# Patient Record
Sex: MALE | Race: WHITE | NOT HISPANIC OR LATINO | Employment: UNEMPLOYED | URBAN - METROPOLITAN AREA
[De-identification: names, ages, dates, MRNs, and addresses within clinical notes are randomized per-mention and may not be internally consistent; named-entity substitution may affect disease eponyms.]

---

## 2017-01-19 ENCOUNTER — OFFICE VISIT (OUTPATIENT)
Dept: CARDIOLOGY | Facility: MEDICAL CENTER | Age: 62
End: 2017-01-19
Payer: MEDICAID

## 2017-01-19 VITALS
HEART RATE: 104 BPM | BODY MASS INDEX: 26.34 KG/M2 | HEIGHT: 70 IN | WEIGHT: 184 LBS | SYSTOLIC BLOOD PRESSURE: 120 MMHG | DIASTOLIC BLOOD PRESSURE: 80 MMHG | OXYGEN SATURATION: 90 %

## 2017-01-19 DIAGNOSIS — I48.0 PAROXYSMAL ATRIAL FIBRILLATION (HCC): ICD-10-CM

## 2017-01-19 DIAGNOSIS — I10 ESSENTIAL HYPERTENSION: ICD-10-CM

## 2017-01-19 DIAGNOSIS — E78.5 DYSLIPIDEMIA: ICD-10-CM

## 2017-01-19 DIAGNOSIS — Z95.1 S/P CABG X 4: Primary | ICD-10-CM

## 2017-01-19 DIAGNOSIS — E11.8 DM (DIABETES MELLITUS) WITH COMPLICATIONS (HCC): ICD-10-CM

## 2017-01-19 DIAGNOSIS — I73.9 CLAUDICATION (HCC): ICD-10-CM

## 2017-01-19 DIAGNOSIS — I21.3 ST ELEVATION MYOCARDIAL INFARCTION (STEMI), UNSPECIFIED ARTERY (HCC): ICD-10-CM

## 2017-01-19 PROCEDURE — 99214 OFFICE O/P EST MOD 30 MIN: CPT | Performed by: INTERNAL MEDICINE

## 2017-01-19 RX ORDER — ASPIRIN 81 MG/1
TABLET ORAL
Qty: 30 TAB | Refills: 5 | Status: SHIPPED | OUTPATIENT
Start: 2017-01-19 | End: 2017-08-06 | Stop reason: SDUPTHER

## 2017-01-19 RX ORDER — ATORVASTATIN CALCIUM 40 MG/1
40 TABLET, FILM COATED ORAL
Qty: 90 TAB | Refills: 3 | Status: SHIPPED | OUTPATIENT
Start: 2017-01-19 | End: 2018-01-11 | Stop reason: SDUPTHER

## 2017-01-19 RX ORDER — CARVEDILOL 3.12 MG/1
3.12 TABLET ORAL 2 TIMES DAILY WITH MEALS
Qty: 180 TAB | Refills: 3 | Status: SHIPPED | OUTPATIENT
Start: 2017-01-19 | End: 2017-08-18

## 2017-01-19 RX ORDER — CLOPIDOGREL BISULFATE 75 MG/1
75 TABLET ORAL DAILY
Qty: 90 TAB | Refills: 3 | Status: SHIPPED | OUTPATIENT
Start: 2017-01-19 | End: 2018-01-11 | Stop reason: SDUPTHER

## 2017-01-19 RX ORDER — AMLODIPINE BESYLATE 10 MG/1
10 TABLET ORAL DAILY
Qty: 90 TAB | Refills: 3 | Status: SHIPPED | OUTPATIENT
Start: 2017-01-19 | End: 2018-01-11 | Stop reason: SDUPTHER

## 2017-01-19 RX ORDER — LISINOPRIL 10 MG/1
10 TABLET ORAL DAILY
Qty: 90 TAB | Refills: 3 | Status: SHIPPED | OUTPATIENT
Start: 2017-01-19 | End: 2018-01-11 | Stop reason: SDUPTHER

## 2017-01-19 ASSESSMENT — ENCOUNTER SYMPTOMS
COUGH: 1
INSOMNIA: 1
NAUSEA: 1
NERVOUS/ANXIOUS: 1
SENSORY CHANGE: 1
BLURRED VISION: 1
ORTHOPNEA: 0
BRUISES/BLEEDS EASILY: 1
TINGLING: 1
PALPITATIONS: 0
CLAUDICATION: 0
PND: 0

## 2017-01-19 NOTE — MR AVS SNAPSHOT
"        Aravind Quinones   2017 1:00 PM   Office Visit   MRN: [id number]    Department:  [facility]   Dept Phone:  [phone]    Description:  Male : 1955   Provider:  Andrew Kelley M.D.           Reason for Visit     Follow-Up           Allergies as of 2017     Allergen Noted Reactions    Sulfa Drugs 2012       ?; pt unsure of reaction      You were diagnosed with     S/P CABG x 4   [451152]  -  Primary     Dyslipidemia   [627143]       ST elevation myocardial infarction (STEMI), unspecified artery (CMS-HCC)   [5475219]       Essential hypertension   [4794079]       Paroxysmal atrial fibrillation (CMS-HCC)   [067636]       Claudication (CMS-HCC)   [976401]       DM (diabetes mellitus) with complications (CMS-HCC)   [748345]         Vital Signs     Blood Pressure Pulse Height Weight Body Mass Index Oxygen Saturation    120/80 mmHg 104 1.778 m (5' 10\") 83.462 kg (184 lb) 26.40 kg/m2 90%    Smoking Status                   Former Smoker           Basic Information     Date Of Birth Sex Race Ethnicity Preferred Language    1955 Male White Non- English      Problem List              ICD-10-CM Priority Class Noted - Resolved    DM (diabetes mellitus) (CMS-HCC) E11.9 Medium  2012 - Present    Family history of diabetes mellitus Z83.3   2012 - Present    Dyslipidemia, goal LDL below 70 E78.5 Low  2012 - Present    Vitamin D insufficiency E55.9 Low  2012 - Present    HTN (hypertension) I10 Medium  2012 - Present    DDD (degenerative disc disease) [id number]   2013 - Present    Arthritis of ankle joint M19.90   2013 - Present    Open wound T14.8   2013 - Present    Achilles tendon infection M65.10 Low  2/10/2015 - Present    Open wound of knee, leg (except thigh), and ankle, with tendon involvement S81.009A, S81.809A, S91.009A   2015 - Present    STEMI (ST elevation myocardial infarction) (CMS-HCC) I21.3 High  2016 - Present   " S/P CABG x 4 Z95.1 High  5/2/2016 - Present    Anemia D64.9 Medium Acute 5/4/2016 - Present    Atrial fibrillation (CMS-HCC) I48.91 High Acute 5/4/2016 - Present    Leukocytosis D72.829 Medium Acute 5/4/2016 - Present    Peripheral neuropathy (CMS-HCC) G62.9 Medium Acute 5/4/2016 - Present    Protein calorie malnutrition (CMS-HCC) E46 Medium Acute 5/4/2016 - Present    Cardiomyopathy (CMS-HCC) I42.9   5/31/2016 - Present    DM (diabetes mellitus) type II controlled, neurological manifestation (CMS-HCC) E11.49   7/6/2016 - Present    Chronic pain G89.29   7/6/2016 - Present    Coronary artery disease involving coronary bypass graft of native heart without angina pectoris I25.810   7/15/2016 - Present      Health Maintenance        Date Due Completion Dates    RETINAL SCREENING 7/12/1971 ---    COLONOSCOPY 7/12/2003 ---    IMM ZOSTER VACCINE 7/12/2013 ---    A1C SCREENING 6/19/2017 12/19/2016, 9/19/2016, 5/23/2016, 3/7/2016, 12/29/2015, 8/31/2015, 4/30/2015, 2/23/2015, 12/12/2014, 9/2/2014, 3/22/2014, 9/10/2013, 6/10/2013, 11/2/2012, 8/27/2012, 6/25/2012, 5/23/2012    DIABETES MONOFILAMENT / LE EXAM 10/27/2017 10/27/2016    FASTING LIPID PROFILE 12/19/2017 12/19/2016, 4/26/2016, 4/30/2015, 2/23/2015, 3/22/2014, 9/10/2013, 6/10/2013, 12/18/2012, 11/2/2012, 8/27/2012, 5/23/2012    URINE ACR / MICROALBUMIN 12/19/2017 12/19/2016, 9/19/2016, 5/23/2016, 3/7/2016, 8/31/2015, 2/23/2015, 12/12/2014, 5/23/2012    SERUM CREATININE 12/19/2017 12/19/2016, 9/19/2016, 5/23/2016, 5/7/2016, 5/3/2016, 5/2/2016, 5/1/2016, 4/30/2016, 4/29/2016, 4/28/2016, 4/27/2016, 4/26/2016, 4/25/2016, 4/25/2016, 4/30/2015, 4/7/2015, 3/31/2015, 3/24/2015, 3/17/2015, 3/10/2015, 3/3/2015, 2/23/2015, 12/12/2014, 3/22/2014, 9/10/2013, 6/10/2013, 2/11/2013, 11/2/2012, 8/27/2012, 6/29/2012, 6/25/2012, 5/23/2012    IMM DTaP/Tdap/Td Vaccine (2 - Td) 9/10/2025 9/10/2015            Current Immunizations     Influenza TIV (IM) 10/17/2013, 11/7/2012    Influenza  Vaccine Quad Inj (Preserved) 10/27/2016, 10/20/2015, 12/15/2014  1:14 PM    Pneumococcal polysaccharide vaccine (PPSV-23) 10/27/2016    Tdap Vaccine 9/10/2015      Below and/or attached are the medications your provider expects you to take. Review all of your home medications and newly ordered medications with your provider and/or pharmacist. Follow medication instructions as directed by your provider and/or pharmacist. Please keep your medication list with you and share with your provider. Update the information when medications are discontinued, doses are changed, or new medications (including over-the-counter products) are added; and carry medication information at all times in the event of emergency situations     Allergies:  SULFA DRUGS - (reactions not documented)               Medications  Valid as of: January 19, 2017 -  1:37 PM    Generic Name Brand Name Tablet Size Instructions for use    Albuterol Sulfate (Aero Soln) VENTOLIN  (90 BASE) MCG/ACT INHALE 2 PUFFS ORALLY EVERY 4 HOURS IF NEEDED FOR SHORTNESS OF BREATH        AmLODIPine Besylate (Tab) NORVASC 10 MG Take 1 Tab by mouth every day.        Aspirin (Tablet Delayed Response) aspirin 81 MG TAKE 1 TABLET ORALLY ONCE DAILY        Atorvastatin Calcium (Tab) LIPITOR 40 MG Take 1 Tab by mouth every bedtime.        Carvedilol (Tab) COREG 3.125 MG Take 1 Tab by mouth 2 times a day, with meals.        Cholecalciferol (Cap) Cholecalciferol 2000 UNIT Take 1 Cap by mouth every morning.        Clopidogrel Bisulfate (Tab) PLAVIX 75 MG Take 1 Tab by mouth every day.        Glucose Blood (Strip) glucose blood  AS DIRECTED TEST BLOOD SUGAR 4 TIMES DAILY        Insulin Glargine (Solution) LANTUS 100 UNIT/ML INJECT 5O UNITS SUBCUTANEOUSLY AS DIRECTED ONCE DAILY        Insulin Glargine (Solution) LANTUS 100 UNIT/ML Inject 40 Units as instructed every day. Increase by 2 u if blood sugars are above 180        Insulin Syringe-Needle U-100 (Misc) INSULIN SYRINGE  ".5CC/29G 29G X 1/2\" 0.5 ML USE AS DIRECTED BY PHYSICIAN        Insulin Syringe-Needle U-100 (Misc) INSULIN SYRINGE .5CC/29G 29G X 1/2\" 0.5 ML To use twice a day with insulin        Lisinopril (Tab) PRINIVIL 10 MG Take 1 Tab by mouth every day.        Misc. Devices (Misc) Misc. Devices  True metrix glucometer and teststrips to check blood sugars qid #120        Omeprazole (Tablet Delayed Response) PRILOSEC 20 MG TAKE 1 TABLET ORALLY ONCE DAILY        Oxycodone-Acetaminophen (Tab) PERCOCET 7.5-325 MG Take 1 Tab by mouth every 6 hours as needed for Moderate Pain (n).        Sennosides-Docusate Sodium (Tab) PERICOLACE or SENOKOT S 8.6-50 MG Take 1 Tab by mouth every day.        .                 Medicines prescribed today were sent to:     [facility]Y #[id number] - [place], [place] - [address] [address]    [address] [address]    Phone: [phone] Fax: [phone]    Open 24 Hours?: No      Medication refill instructions:       If your prescription bottle indicates you have medication refills left, it is not necessary to call your provider’s office. Please contact your pharmacy and they will refill your medication.    If your prescription bottle indicates you do not have any refills left, you may request refills at any time through one of the following ways: The online Websense system (except Urgent Care), by calling your provider’s office, or by asking your pharmacy to contact your provider’s office with a refill request. Medication refills are processed only during regular business hours and may not be available until the next business day. Your provider may request additional information or to have a follow-up visit with you prior to refilling your medication.   *Please Note: Medication refills are assigned a new Rx number when refilled electronically. Your pharmacy may indicate that no refills were authorized even though a new prescription for the same medication is available at the pharmacy. Please request " the medicine by name with the pharmacy before contacting your provider for a refill.        Your To Do List     Future Labs/Procedures Complete By Expires    HOLTER MONITOR STUDY  As directed 1/19/2018    US-DARIUS MULTI LEVEL BILAT  As directed 1/19/2018         MyChart Status: Patient Declined

## 2017-01-19 NOTE — Clinical Note
Name:          Aravind Quinones   YOB: 1955  Date:     1/19/2017      Toro Jurado M.D.  [address]  [facility] [address]     Andrew Kelley MD  [address], [address]  [place] [address]  Phone: [phone]  Back Line: [phone]  Fax: [phone]  E-mail: [email or web address]   Dear Dr. Jurado,    We had the pleasure of seeing your patient, Aravind Quinones, in Cardiology Clinic at [facility] today.    As you know, he is a 61-year-old man with a history of 4-V CABG 4/25/16 (LIMA-LAD, SVG-diag, SVG-OM, SVG-PDA), type II diabetes, hypertension, and dyslipidemia. He was a long-time smoker before bypass and has since quit.    He is doing well from a cardiovascular perspective. He has some left leg numbness and weakness that I suspect is related to lumbar degenerative disc disease. However, his pulse on his left posterior tibial was a bit less then his right (1+ on left, 2+ on right). Related to that, I did repeat his ankle-brachial indices. I refilled all of his cardiovascular medicines including atorvastatin 40 mg by mouth daily at bedtime, amlodipine 10 mg by mouth daily, lisinopril 10 mg by mouth daily, Coreg 3.125 mg by mouth twice a day aspirin 81 mg by mouth daily, and Plavix 75 mg by mouth daily. I will probably continue his Plavix indefinitely in the setting of peripheral artery disease associated with former smoking though he technically only needs that until March related to his prior event. If his ankle-brachial indices are unrevealing, I have asked him to follow-up with primary care for an MRI of his lumbar spine. He is in sinus rhythm by physical examination, and I did repeat a Holter monitor to ensure that he has no asymptomatic proximal systems of atrial fibrillation.    We will have the patient follow-up in 3 months.    Thank you for the referral and please do not hesitate to contact me at any time. My contact information is listed above.    This note was dictated  using Dragon speech recognition software.     A full note including my physical examination and a full list of rectified medications is available in our medical record, and can be faxed as well.    Andrew Kellye MD  Cardiologist  [facility]

## 2017-01-20 NOTE — PROGRESS NOTES
Subjective:   Aravind Quinones is a 61-year-old man with a history of 4-V CABG 4/25/16 (LIMA-LAD, SVG-diag, SVG-OM, SVG-PDA), type II diabetes, hypertension, and dyslipidemia. He was a long-time smoker before bypass and has since quit.    He has no cardiovascular complaint of he does note some weakness and numbness in his left leg. He previously had ankle-brachial indices, which showed moderate arterial insufficiency, but nothing severe of that leg. He is not started smoking again. He seems to have some relationship discord with his primary care physician.    He has noted his left lower extremity numbness and weakness for 10 months since the time of his surgery. He had noted some right arm weakness, but that has since subsided.    He has no side effects of his cardiovascular medicines, and I have refilled all of those at this time.    Past Medical History   Diagnosis Date   • Type II or unspecified type diabetes mellitus without mention of complication, not stated as uncontrolled      IDDM   • Arthritis      L ankle-Osteo   • Pain 02/24/15     L ankle-chronic=7/10   • Hypertension      controlled with meds   • High cholesterol      contolled with meds   • STEMI (ST elevation myocardial infarction) (CMS-[facility]) 4/25/2016   • Tobacco abuse 5/29/2012     Past Surgical History   Procedure Laterality Date   • Achilles tendon repair  1995   • Flexor tendon repair  2/25/2015     Performed by Hiram Enciso M.D. at SURGERY [facility]   • Irrigation & debridement ortho  2/25/2015     Performed by Hiram Enciso M.D. at SURGERY [facility]   • Multiple coronary artery bypass endo vein harvest  4/25/2016     Procedure: MULTIPLE CORONARY ARTERY BYPASS ENDO VEIN HARVEST- X4;  Surgeon: Medardo Goodson M.D.;  Location: SURGERY [facility];  Service:      Family History   Problem Relation Age of Onset   • Diabetes Mother    • Heart Disease Father    • Stroke Father      History   Smoking status   • Former  "Smoker -- 0.25 packs/day for 12 years   • Types: Cigarettes   • Quit date: 04/03/2016   Smokeless tobacco   • Never Used     Allergies   Allergen Reactions   • Sulfa Drugs      ?; pt unsure of reaction     Outpatient Encounter Prescriptions as of 1/19/2017   Medication Sig Dispense Refill   • atorvastatin (LIPITOR) 40 MG Tab Take 1 Tab by mouth every bedtime. 90 Tab 3   • amlodipine (NORVASC) 10 MG Tab Take 1 Tab by mouth every day. 90 Tab 3   • lisinopril (PRINIVIL) 10 MG Tab Take 1 Tab by mouth every day. 90 Tab 3   • carvedilol (COREG) 3.125 MG Tab Take 1 Tab by mouth 2 times a day, with meals. 180 Tab 3   • clopidogrel (PLAVIX) 75 MG Tab Take 1 Tab by mouth every day. 90 Tab 3   • aspirin (ASPIRIN LOW DOSE) 81 MG EC tablet TAKE 1 TABLET ORALLY ONCE DAILY 30 Tab 5   • insulin glargine (LANTUS) 100 UNIT/ML Solution Inject 40 Units as instructed every day. Increase by 2 u if blood sugars are above 180 10 mL 6   • Cholecalciferol 2000 UNIT Cap Take 1 Cap by mouth every morning. 30 Cap 6   • oxycodone-acetaminophen (PERCOCET) 7.5-325 MG per tablet Take 1 Tab by mouth every 6 hours as needed for Moderate Pain (n). (Patient not taking: Reported on 1/19/2017) 30 Tab 0   • VENTOLIN  (90 BASE) MCG/ACT Aero Soln inhalation aerosol INHALE 2 PUFFS ORALLY EVERY 4 HOURS IF NEEDED FOR SHORTNESS OF BREATH (Patient not taking: Reported on 1/19/2017) 1 Inhaler 2   • INSULIN SYRINGE .5CC/29G (ULTICARE INSULIN SYRINGE) 29G X 1/2\" 0.5 ML Misc To use twice a day with insulin 60 Each 2   • INSULIN SYRINGE .5CC/29G (ULTICARE INSULIN SYRINGE) 29G X 1/2\" 0.5 ML Misc USE AS DIRECTED BY PHYSICIAN 60 Each 0   • glucose blood (TRUETEST TEST) strip AS DIRECTED TEST BLOOD SUGAR 4 TIMES DAILY 100 Strip 11   • Misc. Devices Misc True metrix glucometer and teststrips to check blood sugars qid #120 120 Device 6   • omeprazole (PRILOSEC) 20 MG Tablet Delayed Response delayed-release tablet TAKE 1 TABLET ORALLY ONCE DAILY 28 Tab 8   • " "[DISCONTINUED] atorvastatin (LIPITOR) 40 MG Tab Take 1 Tab by mouth every bedtime. 90 Tab 3   • [DISCONTINUED] amlodipine (NORVASC) 10 MG Tab Take 1 Tab by mouth every day. 30 Tab 6   • [DISCONTINUED] lisinopril (PRINIVIL) 10 MG Tab Take 1 Tab by mouth every day. 30 Tab 11   • [DISCONTINUED] carvedilol (COREG) 3.125 MG Tab Take 1 Tab by mouth 2 times a day, with meals. 60 Tab 6   • [DISCONTINUED] clopidogrel (PLAVIX) 75 MG Tab Take 1 Tab by mouth every day. 30 Tab 3   • [DISCONTINUED] aspirin (ASPIRIN LOW DOSE) 81 MG EC tablet TAKE 1 TABLET ORALLY ONCE DAILY 30 Tab 5   • LANTUS 100 UNIT/ML Solution INJECT 5O UNITS SUBCUTANEOUSLY AS DIRECTED ONCE DAILY (Patient not taking: Reported on 1/19/2017) 10 mL 11   • senna-docusate (PERICOLACE OR SENOKOT S) 8.6-50 MG Tab Take 1 Tab by mouth every day. (Patient not taking: Reported on 1/19/2017) 30 Tab 0     No facility-administered encounter medications on file as of 1/19/2017.     Review of Systems   Eyes: Positive for blurred vision.   Respiratory: Positive for cough.    Cardiovascular: Positive for chest pain (musculoskeletal related to sternotomy) and leg swelling (L>R, prior achilles surgery just before CABG). Negative for palpitations, orthopnea, claudication and PND.   Gastrointestinal: Positive for nausea.   Neurological: Positive for tingling and sensory change (in his left leg with weakness).   Endo/Heme/Allergies: Bruises/bleeds easily.   Psychiatric/Behavioral: The patient is nervous/anxious and has insomnia.    All other systems reviewed and are negative.       Objective:   /80 mmHg  Pulse 104  Ht 1.778 m (5' 10\")  Wt 83.462 kg (184 lb)  BMI 26.40 kg/m2  SpO2 90%    Physical Exam   Constitutional: He is oriented to person, place, and time. He appears well-developed and well-nourished. No distress.   Somewhat gruff, elderly-appearing man though very pleasant in no distress   HENT:   Head: Normocephalic and atraumatic.   Eyes: Conjunctivae and EOM are " normal. Pupils are equal, round, and reactive to light. No scleral icterus.   Neck: Neck supple. No JVD present. No tracheal deviation present.   Cardiovascular: Normal rate, regular rhythm, normal heart sounds and intact distal pulses.  Exam reveals no gallop and no friction rub.    No murmur heard.  Pulses:       Dorsalis pedis pulses are 2+ on the right side, and 1+ on the left side.   No carotid bruits, healed midline sternotomy scar noted   Pulmonary/Chest: Effort normal. No stridor. No respiratory distress. He has wheezes. He has no rales.   Mild diffuse end expiratory wheezes, no crackles, no respiratory distress or accessory muscle use   Abdominal: Soft. Bowel sounds are normal. He exhibits no distension.   Musculoskeletal: He exhibits edema (1+ on the left, trace on the right with woody chronic venous stasis changes of his left lower extremity).   4-5 strength with plantar flexion of his left foot, 5 out of 5 strength with plantar flexion of his right foot. Mildly decreased sensation on his left lateral leg.   Neurological: He is alert and oriented to person, place, and time.   Skin: Skin is warm and dry. He is not diaphoretic. No erythema. No pallor.   Venous stasis changes left greater than right as described above   Psychiatric: He has a normal mood and affect. Judgment and thought content normal.   Vitals reviewed.      Assessment:     1. S/P CABG x 4  atorvastatin (LIPITOR) 40 MG Tab    lisinopril (PRINIVIL) 10 MG Tab    carvedilol (COREG) 3.125 MG Tab   2. Dyslipidemia  atorvastatin (LIPITOR) 40 MG Tab   3. ST elevation myocardial infarction (STEMI), unspecified artery (CMS-HCC)  clopidogrel (PLAVIX) 75 MG Tab   4. Essential hypertension  amlodipine (NORVASC) 10 MG Tab   5. Paroxysmal atrial fibrillation (CMS-HCC)  HOLTER MONITOR STUDY   6. Claudication (CMS-HCC)  US-DARIUS MULTI LEVEL BILAT   7. DM (diabetes mellitus) with complications (CMS-HCC)  aspirin (ASPIRIN LOW DOSE) 81 MG EC tablet       Medical  Decision Making:  Today's Assessment / Status / Plan:     Medical Decision Making:    He is doing well from a cardiovascular perspective. He has some left leg numbness and weakness that I suspect is related to lumbar degenerative disc disease. However, his pulse on his left posterior tibial was a bit less then his right (1+ on left, 2+ on right). Related to that, I did repeat his ankle-brachial indices. I refilled all of his cardiovascular medicines including atorvastatin 40 mg by mouth daily at bedtime, amlodipine 10 mg by mouth daily, lisinopril 10 mg by mouth daily, Coreg 3.125 mg by mouth twice a day aspirin 81 mg by mouth daily, and Plavix 75 mg by mouth daily. I will probably continue his Plavix indefinitely in the setting of peripheral artery disease associated with former smoking though he technically only needs that until March related to his prior event. If his ankle-brachial indices are unrevealing, I have asked him to follow-up with primary care for an MRI of his lumbar spine.    Andrew Kelley MD  Cardiologist, [facility]

## 2017-01-20 NOTE — PROGRESS NOTES
Name:          Aravind Quinones   YOB: 1955  Date:     1/19/2017      Toro Jurado M.D.  [address]  [facility] [address]     Andrew Kelley MD  [address], [address]  [place] [address]  Phone: [phone]  Back Line: [phone]  Fax: [phone]  E-mail: [email or web address]   Dear Dr. Jurado,    We had the pleasure of seeing your patient, Aravind Quinones, in Cardiology Clinic at [facility] today.    As you know, he is a 61-year-old man with a history of 4-V CABG 4/25/16 (LIMA-LAD, SVG-diag, SVG-OM, SVG-PDA), type II diabetes, hypertension, and dyslipidemia. He was a long-time smoker before bypass and has since quit.    He is doing well from a cardiovascular perspective. He has some left leg numbness and weakness that I suspect is related to lumbar degenerative disc disease. However, his pulse on his left posterior tibial was a bit less then his right (1+ on left, 2+ on right). Related to that, I did repeat his ankle-brachial indices. I refilled all of his cardiovascular medicines including atorvastatin 40 mg by mouth daily at bedtime, amlodipine 10 mg by mouth daily, lisinopril 10 mg by mouth daily, Coreg 3.125 mg by mouth twice a day aspirin 81 mg by mouth daily, and Plavix 75 mg by mouth daily. I will probably continue his Plavix indefinitely in the setting of peripheral artery disease associated with former smoking though he technically only needs that until March related to his prior event. If his ankle-brachial indices are unrevealing, I have asked him to follow-up with primary care for an MRI of his lumbar spine. He is in sinus rhythm by physical examination, and I did repeat a Holter monitor to ensure that he has no asymptomatic proximal systems of atrial fibrillation.    We will have the patient follow-up in 3 months.    Thank you for the referral and please do not hesitate to contact me at any time. My contact information is listed above.    This note was dictated  using Dragon speech recognition software.     A full note including my physical examination and a full list of rectified medications is available in our medical record, and can be faxed as well.    Andrew Kelley MD  Cardiologist  [facility]

## 2017-01-24 ENCOUNTER — HOSPITAL ENCOUNTER (OUTPATIENT)
Dept: RADIOLOGY | Facility: MEDICAL CENTER | Age: 62
End: 2017-01-24
Attending: INTERNAL MEDICINE
Payer: MEDICAID

## 2017-01-24 DIAGNOSIS — I73.9 CLAUDICATION (HCC): ICD-10-CM

## 2017-01-24 PROCEDURE — 93922 UPR/L XTREMITY ART 2 LEVELS: CPT

## 2017-01-24 PROCEDURE — 93922 UPR/L XTREMITY ART 2 LEVELS: CPT | Mod: 26 | Performed by: INTERNAL MEDICINE

## 2017-02-22 ENCOUNTER — OFFICE VISIT (OUTPATIENT)
Dept: INTERNAL MEDICINE | Facility: MEDICAL CENTER | Age: 62
End: 2017-02-22
Payer: MEDICAID

## 2017-02-22 VITALS
WEIGHT: 175 LBS | DIASTOLIC BLOOD PRESSURE: 62 MMHG | RESPIRATION RATE: 19 BRPM | TEMPERATURE: 98.2 F | HEIGHT: 70 IN | OXYGEN SATURATION: 95 % | HEART RATE: 88 BPM | BODY MASS INDEX: 25.05 KG/M2 | SYSTOLIC BLOOD PRESSURE: 138 MMHG

## 2017-02-22 DIAGNOSIS — E11.40 CONTROLLED TYPE 2 DIABETES MELLITUS WITH DIABETIC NEUROPATHY, UNSPECIFIED LONG TERM INSULIN USE STATUS: ICD-10-CM

## 2017-02-22 DIAGNOSIS — R79.89 LOW VITAMIN D LEVEL: ICD-10-CM

## 2017-02-22 DIAGNOSIS — G89.29 OTHER CHRONIC PAIN: ICD-10-CM

## 2017-02-22 DIAGNOSIS — E11.8 DM (DIABETES MELLITUS) WITH COMPLICATIONS (HCC): ICD-10-CM

## 2017-02-22 DIAGNOSIS — R20.0 NUMBNESS: ICD-10-CM

## 2017-02-22 DIAGNOSIS — K21.9 GASTROESOPHAGEAL REFLUX DISEASE, ESOPHAGITIS PRESENCE NOT SPECIFIED: ICD-10-CM

## 2017-02-22 PROCEDURE — 99204 OFFICE O/P NEW MOD 45 MIN: CPT | Mod: GC | Performed by: INTERNAL MEDICINE

## 2017-02-22 RX ORDER — METFORMIN HYDROCHLORIDE 500 MG/1
500 TABLET, EXTENDED RELEASE ORAL
Qty: 30 TAB | Refills: 2 | Status: SHIPPED | OUTPATIENT
Start: 2017-02-22 | End: 2017-06-05 | Stop reason: SDUPTHER

## 2017-02-22 RX ORDER — NICOTINE POLACRILEX 4 MG/1
GUM, CHEWING ORAL
Qty: 28 TAB | Refills: 8 | Status: SHIPPED | OUTPATIENT
Start: 2017-02-22 | End: 2017-02-23

## 2017-02-22 RX ORDER — IBUPROFEN 200 MG
TABLET ORAL
Qty: 60 EACH | Refills: 2 | Status: SHIPPED | OUTPATIENT
Start: 2017-02-22

## 2017-02-22 RX ORDER — INSULIN GLARGINE 100 [IU]/ML
INJECTION, SOLUTION SUBCUTANEOUS
Qty: 10 ML | Refills: 11 | Status: SHIPPED | OUTPATIENT
Start: 2017-02-22 | End: 2017-05-17

## 2017-02-22 ASSESSMENT — PAIN SCALES - GENERAL: PAINLEVEL: 6=MODERATE PAIN

## 2017-02-22 NOTE — MR AVS SNAPSHOT
"        Aravind Quinones   2017 2:45 PM   Office Visit   MRN: [id number]    Department:  [facility] Med - Internal Med   Dept Phone:  [phone]    Description:  Male : 1955   Provider:  Kitty Ang M.D.           Reason for Visit     Diabetes [facility] follow up      Allergies as of 2017     Allergen Noted Reactions    Sulfa Drugs 2012       ?; pt unsure of reaction      You were diagnosed with     DM (diabetes mellitus) with complications (CMS-HCC)   [168827]       Gastroesophageal reflux disease, esophagitis presence not specified   [4024645]       Controlled type 2 diabetes mellitus with diabetic neuropathy, unspecified long term insulin use status (CMS-HCC)   [0277624]       Numbness   [736927]       Other chronic pain   [338.29.ICD-9-CM]       Low vitamin D level   [3935480]         Vital Signs     Blood Pressure Pulse Temperature Respirations Height Weight    138/62 mmHg 88 36.8 °C (98.2 °F) 19 1.778 m (5' 10\") 79.379 kg (175 lb)    Body Mass Index Oxygen Saturation Smoking Status             25.11 kg/m2 95% Former Smoker         Basic Information     Date Of Birth Sex Race Ethnicity Preferred Language    1955 Male White Non- English      Your appointments     Mar 29, 2017  2:00 PM   Established Patient with Kitty Ang M.D.   [facility] - Internal Medicine (--)    [address]  [address]  [facility] [address] [phone]           You will be receiving a confirmation call a few days before your appointment from our automated call confirmation system.            2017  3:30 PM   FOLLOW UP with Andrew Kelley M.D.   [facility] (--)    [address] [place] [address] [address]  [place] [address] [phone]              Problem List              ICD-10-CM Priority Class Noted - Resolved    DM (diabetes mellitus) (CMS-[facility]) E11.9 Medium  2012 - Present    Family history of diabetes " mellitus Z83.3   5/22/2012 - Present    Dyslipidemia, goal LDL below 70 E78.5 Low  5/29/2012 - Present    Vitamin D insufficiency E55.9 Low  5/29/2012 - Present    HTN (hypertension) I10 Medium  5/29/2012 - Present    DDD (degenerative disc disease) NTM1409   2/14/2013 - Present    Arthritis of ankle joint M19.90   2/14/2013 - Present    Open wound T14.8   2/14/2013 - Present    Achilles tendon infection M65.10 Low  2/10/2015 - Present    Open wound of knee, leg (except thigh), and ankle, with tendon involvement S81.009A, S81.809A, S91.009A   2/25/2015 - Present    STEMI (ST elevation myocardial infarction) (CMS-HCC) I21.3 High  4/25/2016 - Present    S/P CABG x 4 Z95.1 High  5/2/2016 - Present    Anemia D64.9 Medium Acute 5/4/2016 - Present    Atrial fibrillation (CMS-HCC) I48.91 High Acute 5/4/2016 - Present    Leukocytosis D72.829 Medium Acute 5/4/2016 - Present    Peripheral neuropathy (CMS-HCC) G62.9 Medium Acute 5/4/2016 - Present    Protein calorie malnutrition (CMS-HCC) E46 Medium Acute 5/4/2016 - Present    Cardiomyopathy (CMS-HCC) I42.9   5/31/2016 - Present    DM (diabetes mellitus) type II controlled, neurological manifestation (CMS-HCC) E11.49   7/6/2016 - Present    Chronic pain G89.29   7/6/2016 - Present    Coronary artery disease involving coronary bypass graft of native heart without angina pectoris I25.810   7/15/2016 - Present      Health Maintenance        Date Due Completion Dates    RETINAL SCREENING 7/12/1971 ---    COLONOSCOPY 7/12/2003 ---    IMM ZOSTER VACCINE 7/12/2013 ---    A1C SCREENING 6/19/2017 12/19/2016, 9/19/2016, 5/23/2016, 3/7/2016, 12/29/2015, 8/31/2015, 4/30/2015, 2/23/2015, 12/12/2014, 9/2/2014, 3/22/2014, 9/10/2013, 6/10/2013, 11/2/2012, 8/27/2012, 6/25/2012, 5/23/2012    DIABETES MONOFILAMENT / LE EXAM 10/27/2017 10/27/2016    FASTING LIPID PROFILE 12/19/2017 12/19/2016, 4/26/2016, 4/30/2015, 2/23/2015, 3/22/2014, 9/10/2013, 6/10/2013, 12/18/2012, 11/2/2012, 8/27/2012,  5/23/2012    URINE ACR / MICROALBUMIN 12/19/2017 12/19/2016, 9/19/2016, 5/23/2016, 3/7/2016, 8/31/2015, 2/23/2015, 12/12/2014, 5/23/2012    SERUM CREATININE 12/19/2017 12/19/2016, 9/19/2016, 5/23/2016, 5/7/2016, 5/3/2016, 5/2/2016, 5/1/2016, 4/30/2016, 4/29/2016, 4/28/2016, 4/27/2016, 4/26/2016, 4/25/2016, 4/25/2016, 4/30/2015, 4/7/2015, 3/31/2015, 3/24/2015, 3/17/2015, 3/10/2015, 3/3/2015, 2/23/2015, 12/12/2014, 3/22/2014, 9/10/2013, 6/10/2013, 2/11/2013, 11/2/2012, 8/27/2012, 6/29/2012, 6/25/2012, 5/23/2012    IMM DTaP/Tdap/Td Vaccine (2 - Td) 9/10/2025 9/10/2015            Current Immunizations     Influenza TIV (IM) 10/17/2013, 11/7/2012    Influenza Vaccine Quad Inj (Preserved) 10/27/2016, 10/20/2015, 12/15/2014  1:14 PM    Pneumococcal polysaccharide vaccine (PPSV-23) 10/27/2016    Tdap Vaccine 9/10/2015      Below and/or attached are the medications your provider expects you to take. Review all of your home medications and newly ordered medications with your provider and/or pharmacist. Follow medication instructions as directed by your provider and/or pharmacist. Please keep your medication list with you and share with your provider. Update the information when medications are discontinued, doses are changed, or new medications (including over-the-counter products) are added; and carry medication information at all times in the event of emergency situations     Allergies:  SULFA DRUGS - (reactions not documented)               Medications  Valid as of: February 22, 2017 -  3:56 PM    Generic Name Brand Name Tablet Size Instructions for use    Albuterol Sulfate (Aero Soln) VENTOLIN  (90 BASE) MCG/ACT INHALE 2 PUFFS ORALLY EVERY 4 HOURS IF NEEDED FOR SHORTNESS OF BREATH        AmLODIPine Besylate (Tab) NORVASC 10 MG Take 1 Tab by mouth every day.        Aspirin (Tablet Delayed Response) aspirin 81 MG TAKE 1 TABLET ORALLY ONCE DAILY        Atorvastatin Calcium (Tab) LIPITOR 40 MG Take 1 Tab by mouth every  "bedtime.        Carvedilol (Tab) COREG 3.125 MG Take 1 Tab by mouth 2 times a day, with meals.        Cholecalciferol (Cap) Cholecalciferol 2000 UNIT Take 1 Cap by mouth every morning.        Clopidogrel Bisulfate (Tab) PLAVIX 75 MG Take 1 Tab by mouth every day.        Glucose Blood (Strip) glucose blood  AS DIRECTED TEST BLOOD SUGAR 4 TIMES DAILY        Insulin Glargine (Solution) LANTUS 100 UNIT/ML Inject 40 Units as instructed every day. Increase by 2 u if blood sugars are above 180        Insulin Glargine (Solution) LANTUS 100 UNIT/ML INJECT 5O UNITS SUBCUTANEOUSLY AS DIRECTED ONCE DAILY        Insulin Syringe-Needle U-100 (Misc) INSULIN SYRINGE .5CC/29G 29G X 1/2\" 0.5 ML USE AS DIRECTED BY PHYSICIAN        Insulin Syringe-Needle U-100 (Misc) INSULIN SYRINGE .5CC/29G 29G X 1/2\" 0.5 ML To use twice a day with insulin        Lisinopril (Tab) PRINIVIL 10 MG Take 1 Tab by mouth every day.        MetFORMIN HCl (TABLET SR 24 HR) GLUCOPHAGE  MG Take 1 Tab by mouth every bedtime.        Misc. Devices (Misc) Misc. Devices  True metrix glucometer and teststrips to check blood sugars qid #120        Omeprazole (Tablet Delayed Response) PRILOSEC 20 MG TAKE 1 TABLET ORALLY ONCE DAILY        Oxycodone-Acetaminophen (Tab) PERCOCET 7.5-325 MG Take 1 Tab by mouth every 6 hours as needed for Moderate Pain (n).        Sennosides-Docusate Sodium (Tab) PERICOLACE or SENOKOT S 8.6-50 MG Take 1 Tab by mouth every day.        .                 Medicines prescribed today were sent to:     [facility][id number] - [place] - [address] [address]    [address] [address] [place] [address]    Phone: [phone] Fax: [phone]    Open 24 Hours?: No      Medication refill instructions:       If your prescription bottle indicates you have medication refills left, it is not necessary to call your provider’s office. Please contact your pharmacy and they will refill your medication.    If your prescription bottle indicates you do not have " any refills left, you may request refills at any time through one of the following ways: The online [email or web address] system (except Urgent Care), by calling your provider’s office, or by asking your pharmacy to contact your provider’s office with a refill request. Medication refills are processed only during regular business hours and may not be available until the next business day. Your provider may request additional information or to have a follow-up visit with you prior to refilling your medication.   *Please Note: Medication refills are assigned a new Rx number when refilled electronically. Your pharmacy may indicate that no refills were authorized even though a new prescription for the same medication is available at the pharmacy. Please request the medicine by name with the pharmacy before contacting your provider for a refill.        Your To Do List     Future Labs/Procedures Complete By Expires    MR-LUMBAR SPINE-W/O  As directed 2/22/2018    VITAMIN D,25 HYDROXY  As directed 2/22/2018      Referral     A referral request has been sent to our patient care coordination department. Please allow 3-5 business days for us to process this request and contact you either by phone or mail. If you do not hear from us by the 5th business day, please call us at [phone].           [email or web address] Status: Patient Declined

## 2017-02-22 NOTE — PROGRESS NOTES
New Patient to Establish    Reason to establish: New patient to establish    CC:   1. Diabetes management   2. Pain management   3. Numbness in the left lower extremities        HPI: Aravind Quinones is a 61 y.o. Male with past history of Diabetes mellitus type 2 diagnosed 3 years ago, myocardial infarction s/p CABG, patient presented to the clinic to establish care after he disagree with his PCP, he states he was not treating him well, as he run out of pain medication and insulin for the past one month without refills, his insulin dose was 50 units of Lantus/daily before he run out of his medications, he denies any recent ER visit for hyperglycemia, any abdominal pain, nausea, vomiting, confusion, coma. Patient tried metformin in the past and stopped because of the GI upset.    Chronic pain management  the patient was on precost for the past three years/ prescribed by his PCP for his chronic pain, couple surgeries in the past, and back pain, patient refused to take Voltaren or ibuprofen when I offered for his pain and requested opiate after explanation patient agree to be referred to pain management clinic.    Numbness   Started after CABG surgery last year in both Rt arm and left leg, numbness in the arm resolved, but the one on the leg stay after surgery, denies any weakness, or balance issue, no neurological deficit were found on examination.    CAD  S/p CABG, pain resolved, denies any similar symptoms, no palpation, shortness of breath, following Dr. Kelley in [facility] cardiology        Review of Systems:     Constitutional: Denies fevers, Denies weight changes  Eyes: Denies eye pain  Ears/Nose/Throat/Mouth: Denies nasal congestion or sore throat   Cardiovascular: Denies chest pain or palpitations   Respiratory: Denies shortness of breath , Denies cough  Gastrointestinal/Hepatic: Denies abdominal pain, nausea, vomiting, diarrhea or constipation.  Genitourinary: Denies bladder dysfunction, dysuria or  frequency  Musculoskeletal/Rheum: per H and P  Skin: Denies rash.  Neurological: Denies headache, confusion, memory loss or focal weakness/parasthesias  Psychiatric: denies mood disorder      Patient Active Problem List    Diagnosis Date Noted   • Atrial fibrillation (CMS-HCC) 05/04/2016     Priority: High     Class: Acute   • S/P CABG x 4 05/02/2016     Priority: High   • STEMI (ST elevation myocardial infarction) (CMS-HCC) 04/25/2016     Priority: High   • Anemia 05/04/2016     Priority: Medium     Class: Acute   • Leukocytosis 05/04/2016     Priority: Medium     Class: Acute   • Peripheral neuropathy (CMS-HCC) 05/04/2016     Priority: Medium     Class: Acute   • Protein calorie malnutrition (CMS-HCC) 05/04/2016     Priority: Medium     Class: Acute   • HTN (hypertension) 05/29/2012     Priority: Medium   • DM (diabetes mellitus) (CMS-HCC) 05/22/2012     Priority: Medium   • Achilles tendon infection 02/10/2015     Priority: Low   • Dyslipidemia, goal LDL below 70 05/29/2012     Priority: Low   • Vitamin D insufficiency 05/29/2012     Priority: Low   • Coronary artery disease involving coronary bypass graft of native heart without angina pectoris 07/15/2016   • DM (diabetes mellitus) type II controlled, neurological manifestation (CMS-HCC) 07/06/2016   • Chronic pain 07/06/2016   • Cardiomyopathy (CMS-HCC) 05/31/2016   • Open wound of knee, leg (except thigh), and ankle, with tendon involvement 02/25/2015   • DDD (degenerative disc disease) 02/14/2013   • Arthritis of ankle joint 02/14/2013   • Open wound 02/14/2013   • Family history of diabetes mellitus 05/22/2012       Past Medical History   Diagnosis Date   • Type II or unspecified type diabetes mellitus without mention of complication, not stated as uncontrolled      IDDM   • Arthritis      L ankle-Osteo   • Pain 02/24/15     L ankle-chronic=7/10   • Hypertension      controlled with meds   • High cholesterol      contolled with meds   • STEMI (ST elevation  "myocardial infarction) (CMS-[facility]) 4/25/2016   • Tobacco abuse 5/29/2012       Current Outpatient Prescriptions   Medication Sig Dispense Refill   • atorvastatin (LIPITOR) 40 MG Tab Take 1 Tab by mouth every bedtime. 90 Tab 3   • amlodipine (NORVASC) 10 MG Tab Take 1 Tab by mouth every day. 90 Tab 3   • lisinopril (PRINIVIL) 10 MG Tab Take 1 Tab by mouth every day. 90 Tab 3   • carvedilol (COREG) 3.125 MG Tab Take 1 Tab by mouth 2 times a day, with meals. 180 Tab 3   • clopidogrel (PLAVIX) 75 MG Tab Take 1 Tab by mouth every day. 90 Tab 3   • aspirin (ASPIRIN LOW DOSE) 81 MG EC tablet TAKE 1 TABLET ORALLY ONCE DAILY 30 Tab 5   • oxycodone-acetaminophen (PERCOCET) 7.5-325 MG per tablet Take 1 Tab by mouth every 6 hours as needed for Moderate Pain (n). 30 Tab 0   • VENTOLIN  (90 BASE) MCG/ACT Aero Soln inhalation aerosol INHALE 2 PUFFS ORALLY EVERY 4 HOURS IF NEEDED FOR SHORTNESS OF BREATH 1 Inhaler 2   • INSULIN SYRINGE .5CC/29G (ULTICARE INSULIN SYRINGE) 29G X 1/2\" 0.5 ML Misc To use twice a day with insulin 60 Each 2   • INSULIN SYRINGE .5CC/29G (ULTICARE INSULIN SYRINGE) 29G X 1/2\" 0.5 ML Misc USE AS DIRECTED BY PHYSICIAN 60 Each 0   • glucose blood (TRUETEST TEST) strip AS DIRECTED TEST BLOOD SUGAR 4 TIMES DAILY 100 Strip 11   • Misc. Devices Misc True metrix glucometer and teststrips to check blood sugars qid #120 120 Device 6   • insulin glargine (LANTUS) 100 UNIT/ML Solution Inject 40 Units as instructed every day. Increase by 2 u if blood sugars are above 180 10 mL 6   • omeprazole (PRILOSEC) 20 MG Tablet Delayed Response delayed-release tablet TAKE 1 TABLET ORALLY ONCE DAILY 28 Tab 8   • LANTUS 100 UNIT/ML Solution INJECT 5O UNITS SUBCUTANEOUSLY AS DIRECTED ONCE DAILY 10 mL 11   • senna-docusate (PERICOLACE OR SENOKOT S) 8.6-50 MG Tab Take 1 Tab by mouth every day. 30 Tab 0   • Cholecalciferol 2000 UNIT Cap Take 1 Cap by mouth every morning. 30 Cap 6     No current facility-administered medications for " "this visit.       Allergies as of 02/22/2017 - [place] as Reviewed 02/22/2017   Allergen Reaction Noted   • Sulfa drugs  05/22/2012       Social History     Social History   • Marital Status:      Spouse Name: N/A   • Number of Children: N/A   • Years of Education: N/A     Occupational History   • Not on file.     Social History Main Topics   • Smoking status: Former Smoker -- 0.25 packs/day for 12 years     Types: Cigarettes     Quit date: 04/03/2016   • Smokeless tobacco: Never Used   • Alcohol Use: No   • Drug Use: No   • Sexual Activity:     Partners: Female     Other Topics Concern   • Not on file     Social History Narrative       Family History   Problem Relation Age of Onset   • Diabetes Mother    • Heart Disease Father    • Stroke Father        Past Surgical History   Procedure Laterality Date   • Achilles tendon repair  1995   • Flexor tendon repair  2/25/2015     Performed by Hiram Enciso M.D. at SURGERY [facility]   • Irrigation & debridement ortho  2/25/2015     Performed by Hiram Enciso M.D. at SURGERY [facility]   • Multiple coronary artery bypass endo vein harvest  4/25/2016     Procedure: MULTIPLE CORONARY ARTERY BYPASS ENDO VEIN HARVEST- X4;  Surgeon: Medardo Goodson M.D.;  Location: SURGERY [facility];  Service:      /62 mmHg  Pulse 88  Temp(Src) 36.8 °C (98.2 °F)  Resp 19  Ht 1.778 m (5' 10\")  Wt 79.379 kg (175 lb)  BMI 25.11 kg/m2  SpO2 95%    Physical Exam  General:  Alert and oriented, No apparent distress.  Eyes: Pupils equal and reactive. No scleral icterus.  Throat: Clear no erythema or exudates noted.  Neck: Supple. No lymphadenopathy noted. Thyroid not enlarged.  Lungs: Clear to auscultation bilaterally. No wheezes, rhonchi or crackles.  Cardiovascular: scar from CABG Regular rate and rhythm. No murmurs, rubs or gallops.  Abdomen:  Soft, non tender, non distended. Bowel sounds positive.  Extremities: No clubbing, cyanosis, edema. Scar on the left " "ankle from the tendon surgery  Skin: Clear. No rash or suspicious skin lesions noted.      Assessment and Plan    1. DM (diabetes mellitus) with complications (CMS-HCC)  - poor controlled   - Type 2 on insulin   - History of DM since 3 years ago  - last time use insulin was one month ago because of lack of prescription   - metformin cause GI upset, started on extended release trial with the insulin  - will start a small dose of metformin but extended release form for better tolerance than immediate release   - patient denies any active issues, quit smoking a year ago, drink alcohol occasionally.    - last hemoglobin A1c was  Lab Results   Component Value Date/Time    GLYCOHEMOGLOBIN 10.9* 12/19/2016 11:47 AM    GLYCOHEMOGLOBIN 9.5* 09/19/2016 09:26 AM    GLYCOHEMOGLOBIN 8.3* 05/23/2016 10:50 AM    GLYCOHEMOGLOBIN 10.9* 03/07/2016 10:26 AM     - Feet examination was normal   - Neuropathy negative on examination  - Blood pressure well controlled       Plan   - glucose blood (TRUETEST TEST) strip; AS DIRECTED TEST BLOOD SUGAR 4 TIMES DAILY  Dispense: 100 Strip; Refill: 11  - insulin glargine (LANTUS) 100 UNIT/ML Solution; INJECT 5O UNITS SUBCUTANEOUSLY AS DIRECTED ONCE DAILY  Dispense: 10 mL; Refill: 11  - INSULIN SYRINGE .5CC/29G (ULTICARE INSULIN SYRINGE) 29G X 1/2\" 0.5 ML Misc; To use twice a day with insulin  Dispense: 60 Each; Refill: 2  - REFERRAL TO OPHTHALMOLOGY  - metformin ER (GLUCOPHAGE XR) 500 MG TABLET SR 24 HR; Take 1 Tab by mouth every bedtime.  Dispense: 30 Tab; Refill: 2      2. Gastroesophageal reflux disease, esophagitis presence not specified  - stable with no nausea, vomiting, metal taste, change in voice or epigastric/ heartburn     Refills  - omeprazole (PRILOSEC) 20 MG Tablet Delayed Response delayed-release tablet; TAKE 1 TABLET ORALLY ONCE DAILY  Dispense: 28 Tab; Refill: 8    3. Low vitamin D level  - last time measured was in 2014  - patient on vit supplement / DC if the lab wnl    Plan   - " VITAMIN D,25 HYDROXY; Future        4. Numbness  - started after CABG in the Rt upper extremity improved but the left lower extremity remain with no improvement.    Plan  - MR-LUMBAR SPINE-W/O; Future    5. Other chronic pain  - patient had a chronic back pain, surgery related pain, requested oxycodone  - recently change his PCP  - Explained to the patient other alternative pain management options   - agree to be referred to TO PAIN CLINIC  - MR-LUMBAR SPINE-W/O; Future        6. CABG  - No cardiac symptoms since surgery last year in April,   - following Dr Kelley   - Received refills for his medication by cardiologist       Signed by: Kitty Ang M.D.

## 2017-02-23 DIAGNOSIS — K21.9 GASTROESOPHAGEAL REFLUX DISEASE WITHOUT ESOPHAGITIS: ICD-10-CM

## 2017-02-23 RX ORDER — MECOBALAMIN 5000 MCG
15 TABLET,DISINTEGRATING ORAL DAILY
Qty: 60 CAP | Refills: 0 | Status: SHIPPED | OUTPATIENT
Start: 2017-02-23 | End: 2017-04-26

## 2017-03-09 ENCOUNTER — TELEPHONE (OUTPATIENT)
Dept: INTERNAL MEDICINE | Facility: MEDICAL CENTER | Age: 62
End: 2017-03-09

## 2017-03-09 NOTE — TELEPHONE ENCOUNTER
1. Caller Name: Aravind Quinones                      Call Back Number: [phone] (home)       2. Message: Patient called left voice message to call [facility] pharmacy and refill order for True Metric glucose strips. I called [facility] pharmacy they have test strips waiting for patient to call regarding insurance method of payment. I called patient and left voice message for patient to call pharmacy.    3. Patient approves office to leave a detailed voicemail/MyChart message: yes

## 2017-04-25 ENCOUNTER — HOSPITAL ENCOUNTER (OUTPATIENT)
Dept: LAB | Facility: MEDICAL CENTER | Age: 62
End: 2017-04-25
Attending: INTERNAL MEDICINE
Payer: MEDICAID

## 2017-04-25 DIAGNOSIS — R79.89 LOW VITAMIN D LEVEL: ICD-10-CM

## 2017-04-25 LAB — 25(OH)D3 SERPL-MCNC: 27 NG/ML (ref 30–100)

## 2017-04-25 PROCEDURE — 36415 COLL VENOUS BLD VENIPUNCTURE: CPT

## 2017-04-25 PROCEDURE — 82306 VITAMIN D 25 HYDROXY: CPT

## 2017-04-26 ENCOUNTER — OFFICE VISIT (OUTPATIENT)
Dept: CARDIOLOGY | Facility: MEDICAL CENTER | Age: 62
End: 2017-04-26
Payer: MEDICAID

## 2017-04-26 VITALS
WEIGHT: 191 LBS | OXYGEN SATURATION: 93 % | HEIGHT: 70 IN | DIASTOLIC BLOOD PRESSURE: 80 MMHG | HEART RATE: 84 BPM | BODY MASS INDEX: 27.35 KG/M2 | SYSTOLIC BLOOD PRESSURE: 120 MMHG

## 2017-04-26 DIAGNOSIS — R00.2 PALPITATIONS: ICD-10-CM

## 2017-04-26 DIAGNOSIS — I25.810 CORONARY ARTERY DISEASE INVOLVING CORONARY BYPASS GRAFT OF NATIVE HEART WITHOUT ANGINA PECTORIS: ICD-10-CM

## 2017-04-26 DIAGNOSIS — R07.89 OTHER CHEST PAIN: ICD-10-CM

## 2017-04-26 DIAGNOSIS — I48.0 PAROXYSMAL ATRIAL FIBRILLATION (HCC): ICD-10-CM

## 2017-04-26 DIAGNOSIS — I10 ESSENTIAL HYPERTENSION: ICD-10-CM

## 2017-04-26 DIAGNOSIS — E78.5 DYSLIPIDEMIA: ICD-10-CM

## 2017-04-26 DIAGNOSIS — G89.4 CHRONIC PAIN SYNDROME: ICD-10-CM

## 2017-04-26 DIAGNOSIS — I42.9 CARDIOMYOPATHY (HCC): ICD-10-CM

## 2017-04-26 DIAGNOSIS — Z95.1 S/P CABG X 4: ICD-10-CM

## 2017-04-26 PROCEDURE — 99214 OFFICE O/P EST MOD 30 MIN: CPT | Performed by: INTERNAL MEDICINE

## 2017-04-26 ASSESSMENT — ENCOUNTER SYMPTOMS
ORTHOPNEA: 0
SENSORY CHANGE: 1
BACK PAIN: 1
TINGLING: 1
PND: 0
CLAUDICATION: 0
NERVOUS/ANXIOUS: 1
BRUISES/BLEEDS EASILY: 1
BLURRED VISION: 1
PALPITATIONS: 1
INSOMNIA: 1

## 2017-04-26 NOTE — MR AVS SNAPSHOT
"        Aravind Quinones   2017 3:30 PM   Office Visit   MRN: [id number]    Department:  [facility]   Dept Phone:  [phone]    Description:  Male : 1955   Provider:  nAdrew Kelley M.D.           Reason for Visit     Follow-Up           Allergies as of 2017     Allergen Noted Reactions    Sulfa Drugs 2012       ?; pt unsure of reaction      You were diagnosed with     S/P CABG x 4   [142218]       Chronic pain syndrome   [338.4.ICD-9-CM]       Other chest pain   [786.59.ICD-9-CM]       Coronary artery disease involving coronary bypass graft of native heart without angina pectoris   [8533353]       Palpitations   [785.1.ICD-9-CM]         Vital Signs     Blood Pressure Pulse Height Weight Body Mass Index Oxygen Saturation    120/80 mmHg 84 1.778 m (5' 10\") 86.637 kg (191 lb) 27.41 kg/m2 93%    Smoking Status                   Former Smoker           Basic Information     Date Of Birth Sex Race Ethnicity Preferred Language    1955 Male White Non- English      Your appointments     May 03, 2017  3:00 PM   Established Patient with Kitty Ang M.D.   [facility] / [facility] Med - Internal Medicine (--)    [address] [place] [address] [address]  [facility] [address] [phone]           You will be receiving a confirmation call a few days before your appointment from our automated call confirmation system.            Aug 18, 2017  3:15 PM   FOLLOW UP with Andrew Kelley M.D.   [facility] (--)    [address] [place] [address]  [facility] [address] [phone]              Problem List              ICD-10-CM Priority Class Noted - Resolved    DM (diabetes mellitus) (CMS-HCC) E11.9 Medium  2012 - Present    Family history of diabetes mellitus Z83.3   2012 - Present    Dyslipidemia, goal LDL below 70 E78.5 Low  2012 - Present    Vitamin D insufficiency E55.9 Low  2012 - Present    HTN (hypertension) I10 Medium  " 5/29/2012 - Present    DDD (degenerative disc disease) [id number]   2/14/2013 - Present    Arthritis of ankle joint M19.90   2/14/2013 - Present    Open wound T14.8   2/14/2013 - Present    Achilles tendon infection M65.10 Low  2/10/2015 - Present    Open wound of knee, leg (except thigh), and ankle, with tendon involvement S81.009A, S81.809A, S91.009A   2/25/2015 - Present    STEMI (ST elevation myocardial infarction) (CMS-HCC) I21.3 High  4/25/2016 - Present    S/P CABG x 4 Z95.1 High  5/2/2016 - Present    Anemia D64.9 Medium Acute 5/4/2016 - Present    Atrial fibrillation (CMS-HCC) I48.91 High Acute 5/4/2016 - Present    Leukocytosis D72.829 Medium Acute 5/4/2016 - Present    Peripheral neuropathy (CMS-HCC) G62.9 Medium Acute 5/4/2016 - Present    Protein calorie malnutrition (CMS-HCC) E46 Medium Acute 5/4/2016 - Present    Cardiomyopathy (CMS-HCC) I42.9   5/31/2016 - Present    DM (diabetes mellitus) type II controlled, neurological manifestation (CMS-HCC) E11.49   7/6/2016 - Present    Chronic pain G89.29   7/6/2016 - Present    Coronary artery disease involving coronary bypass graft of native heart without angina pectoris I25.810   7/15/2016 - Present      Health Maintenance        Date Due Completion Dates    RETINAL SCREENING 7/12/1971 ---    IMM ZOSTER VACCINE 7/12/2013 ---    A1C SCREENING 6/19/2017 12/19/2016, 9/19/2016, 5/23/2016, 3/7/2016, 12/29/2015, 8/31/2015, 4/30/2015, 2/23/2015, 12/12/2014, 9/2/2014, 3/22/2014, 9/10/2013, 6/10/2013, 11/2/2012, 8/27/2012, 6/25/2012, 5/23/2012    DIABETES MONOFILAMENT / LE EXAM 10/27/2017 10/27/2016    FASTING LIPID PROFILE 12/19/2017 12/19/2016, 4/26/2016, 4/30/2015, 2/23/2015, 3/22/2014, 9/10/2013, 6/10/2013, 12/18/2012, 11/2/2012, 8/27/2012, 5/23/2012    URINE ACR / MICROALBUMIN 12/19/2017 12/19/2016, 9/19/2016, 5/23/2016, 3/7/2016, 8/31/2015, 2/23/2015, 12/12/2014, 5/23/2012    SERUM CREATININE 12/19/2017 12/19/2016, 9/19/2016, 5/23/2016, 5/7/2016, 5/3/2016,  5/2/2016, 5/1/2016, 4/30/2016, 4/29/2016, 4/28/2016, 4/27/2016, 4/26/2016, 4/25/2016, 4/25/2016, 4/30/2015, 4/7/2015, 3/31/2015, 3/24/2015, 3/17/2015, 3/10/2015, 3/3/2015, 2/23/2015, 12/12/2014, 3/22/2014, 9/10/2013, 6/10/2013, 2/11/2013, 11/2/2012, 8/27/2012, 6/29/2012, 6/25/2012, 5/23/2012    IMM DTaP/Tdap/Td Vaccine (2 - Td) 9/10/2025 9/10/2015    COLONOSCOPY 4/20/2027 4/20/2017 (N/S)    Override on 4/20/2017: (N/S) (PER GIC AND C NO COLONOSCOPY)            Current Immunizations     Influenza TIV (IM) 10/17/2013, 11/7/2012    Influenza Vaccine Quad Inj (Preserved) 10/27/2016, 10/20/2015, 12/15/2014  1:14 PM    Pneumococcal polysaccharide vaccine (PPSV-23) 10/27/2016    Tdap Vaccine 9/10/2015      Below and/or attached are the medications your provider expects you to take. Review all of your home medications and newly ordered medications with your provider and/or pharmacist. Follow medication instructions as directed by your provider and/or pharmacist. Please keep your medication list with you and share with your provider. Update the information when medications are discontinued, doses are changed, or new medications (including over-the-counter products) are added; and carry medication information at all times in the event of emergency situations     Allergies:  SULFA DRUGS - (reactions not documented)               Medications  Valid as of: April 26, 2017 -  4:26 PM    Generic Name Brand Name Tablet Size Instructions for use    Albuterol Sulfate (Aero Soln) VENTOLIN  (90 BASE) MCG/ACT INHALE 2 PUFFS ORALLY EVERY 4 HOURS IF NEEDED FOR SHORTNESS OF BREATH        AmLODIPine Besylate (Tab) NORVASC 10 MG Take 1 Tab by mouth every day.        Aspirin (Tablet Delayed Response) aspirin 81 MG TAKE 1 TABLET ORALLY ONCE DAILY        Atorvastatin Calcium (Tab) LIPITOR 40 MG Take 1 Tab by mouth every bedtime.        Carvedilol (Tab) COREG 3.125 MG Take 1 Tab by mouth 2 times a day, with meals.        Cholecalciferol  "(Cap) Cholecalciferol 2000 UNIT Take 1 Cap by mouth every morning.        Clopidogrel Bisulfate (Tab) PLAVIX 75 MG Take 1 Tab by mouth every day.        Glucose Blood (Strip) glucose blood  AS DIRECTED TEST BLOOD SUGAR 4 TIMES DAILY        Insulin Glargine (Solution) LANTUS 100 UNIT/ML Inject 40 Units as instructed every day. Increase by 2 u if blood sugars are above 180        Insulin Glargine (Solution) LANTUS 100 UNIT/ML INJECT 5O UNITS SUBCUTANEOUSLY AS DIRECTED ONCE DAILY        Insulin Syringe-Needle U-100 (Misc) INSULIN SYRINGE .5CC/29G 29G X 1/2\" 0.5 ML USE AS DIRECTED BY PHYSICIAN        Insulin Syringe-Needle U-100 (Misc) INSULIN SYRINGE .5CC/29G 29G X 1/2\" 0.5 ML To use twice a day with insulin        Lansoprazole (CAPSULE DELAYED RELEASE) PREVACID 15 MG Take 1 Cap by mouth every day.        Lisinopril (Tab) PRINIVIL 10 MG Take 1 Tab by mouth every day.        MetFORMIN HCl (TABLET SR 24 HR) GLUCOPHAGE  MG Take 1 Tab by mouth every bedtime.        Misc. Devices (Misc) Misc. Devices  True metrix glucometer and teststrips to check blood sugars qid #120        Oxycodone-Acetaminophen (Tab) PERCOCET 7.5-325 MG Take 1 Tab by mouth every 6 hours as needed for Moderate Pain (n).        Sennosides-Docusate Sodium (Tab) PERICOLACE or SENOKOT S 8.6-50 MG Take 1 Tab by mouth every day.        .                 Medicines prescribed today were sent to:     [id number] - [place] - [address] [address]    [address] [address] [place] [address]    Phone: [phone] Fax: [phone]    Open 24 Hours?: No      Medication refill instructions:       If your prescription bottle indicates you have medication refills left, it is not necessary to call your provider’s office. Please contact your pharmacy and they will refill your medication.    If your prescription bottle indicates you do not have any refills left, you may request refills at any time through one of the following ways: The online [employer] system " (except Urgent Care), by calling your provider’s office, or by asking your pharmacy to contact your provider’s office with a refill request. Medication refills are processed only during regular business hours and may not be available until the next business day. Your provider may request additional information or to have a follow-up visit with you prior to refilling your medication.   *Please Note: Medication refills are assigned a new Rx number when refilled electronically. Your pharmacy may indicate that no refills were authorized even though a new prescription for the same medication is available at the pharmacy. Please request the medicine by name with the pharmacy before contacting your provider for a refill.        Your To Do List     Future Labs/Procedures Complete By Expires    HOLTER MONITOR STUDY  As directed 4/26/2018    NM-CARDIAC STRESS TEST  As directed 4/26/2018         Rocio Status: Patient Declined

## 2017-04-26 NOTE — Clinical Note
Name:          Aravind Quinones   YOB: 1955  Date:     4/26/2017      Kitty Ang M.D.  [address]  [place] [address]     Andrew Kelley MD  [address]  [place] [address]  Phone: [phone]  Back Line: [phone]  Fax: [phone]  E-mail: [email or web address]   Dear Dr. Ang,    We had the pleasure of seeing your patient, Aravind Quinones, in Cardiology Clinic at [facility] today.    As you know, he is a 61-year-old man with a history of 4-V CABG 4/25/16 (LIMA-LAD, SVG-diag, SVG-OM, SVG-PDA), type II diabetes, hypertension, and dyslipidemia. He was a long-time smoker before bypass and has since quit.    From the cardiovascular perspective his blood pressure is well controlled as are his lipids on medication though his blood sugars continue to be very poorly controlled with a recent A1c above 10 (which seems to be related to non-adherence to insulin). He continues to relate quite a bit of limitation related to his left lower extremity pain. I reviewed with him ankle brachial indices that I had ordered at our last appointment, which document essentially normal blood flow to his leg. I suspect that his leg pain is related to lumbar degenerative disc disease. He again perseverates on the idea that this is all the result of his bypass surgery, which I do not think is the case. He does also tell me about an odd sensation in his chest with lifting heavy weights that is concerning for potential angina versus arrhythmia. I worked that up with myocardial perfusion imaging, and a Holter monitor. I do not suspect that this represents loss of any of his bypass grafts or a true arrhythmia. He is, however, pushing for permanent disability. I do not suspect that he will meet criteria for that based on his cardiovascular disease as he is completely revascularized and on good medical management. I encouraged him to obtain the MRI previously ordered to evaluate  his lumbar degenerative disc disease.    We will have the patient follow-up in 3 months.    Thank you for the referral and please do not hesitate to contact me at any time. My contact information is listed above.    This note was dictated using Dragon speech recognition software.     A full note including my physical examination and a full list of rectified medications is available in our medical record, and can be faxed as well.    Andrew Kelley MD  Cardiologist  [facility]

## 2017-04-27 NOTE — PROGRESS NOTES
"Subjective:   Aravind Quinones is a 61 -year-old man with a history of 4-V CABG 4/25/16 (LIMA-LAD, SVG-diag, SVG-OM, SVG-PDA), type II diabetes, hypertension, and dyslipidemia. He was a long-time smoker before bypass and has since quit.    He seems to be doing actually relatively poorly today. He tells me that he has a sensation of his \"heart generally rounded his chest.\" That occurs when he picks up his 2-year-old granddaughter, or any other heavy objects. He also has some dyspnea, and chest discomfort that does not sound typical for angina.    His biggest complaint in limitation at this point his left lower extremity pain. I reviewed with him his previous ankle-brachial indices that I ordered at our last visit that showed normal blood flow. He is scheduled for an MRI of his spine though he has not yet done that.    Past Medical History   Diagnosis Date   • Type II or unspecified type diabetes mellitus without mention of complication, not stated as uncontrolled      IDDM   • Arthritis      L ankle-Osteo   • Pain 02/24/15     L ankle-chronic=7/10   • Hypertension      controlled with meds   • High cholesterol      contolled with meds   • STEMI (ST elevation myocardial infarction) (CMS-[facility]) 4/25/2016   • Tobacco abuse 5/29/2012   • 4-V CABG 4/25/16 (LIMA-LAD, SVG-diag, SVG-OM, SVG-PDA) 5/2/2016     Past Surgical History   Procedure Laterality Date   • Achilles tendon repair  1995   • Flexor tendon repair  2/25/2015     Performed by Hiram Enciso M.D. at [facility]   • Irrigation & debridement ortho  2/25/2015     Performed by Hiram Enciso M.D. at [facility]   • Multiple coronary artery bypass endo vein harvest  4/25/2016     Procedure: MULTIPLE CORONARY ARTERY BYPASS ENDO VEIN HARVEST- X4;  Surgeon: Medardo Goodson M.D.;  Location: [facility];  Service:      Family History   Problem Relation Age of Onset   • Diabetes Mother    • Heart Disease Father    • Stroke " "Father      History   Smoking status   • Former Smoker -- 0.25 packs/day for 12 years   • Types: Cigarettes   • Quit date: 04/03/2016   Smokeless tobacco   • Never Used     Allergies   Allergen Reactions   • Sulfa Drugs      ?; pt unsure of reaction     Outpatient Encounter Prescriptions as of 4/26/2017   Medication Sig Dispense Refill   • metformin ER (GLUCOPHAGE XR) 500 MG TABLET SR 24 HR Take 1 Tab by mouth every bedtime. 30 Tab 2   • atorvastatin (LIPITOR) 40 MG Tab Take 1 Tab by mouth every bedtime. 90 Tab 3   • amlodipine (NORVASC) 10 MG Tab Take 1 Tab by mouth every day. 90 Tab 3   • lisinopril (PRINIVIL) 10 MG Tab Take 1 Tab by mouth every day. 90 Tab 3   • carvedilol (COREG) 3.125 MG Tab Take 1 Tab by mouth 2 times a day, with meals. 180 Tab 3   • clopidogrel (PLAVIX) 75 MG Tab Take 1 Tab by mouth every day. 90 Tab 3   • aspirin (ASPIRIN LOW DOSE) 81 MG EC tablet TAKE 1 TABLET ORALLY ONCE DAILY 30 Tab 5   • Cholecalciferol 2000 UNIT Cap Take 1 Cap by mouth every morning. 30 Cap 6   • [DISCONTINUED] lansoprazole (PREVACID) 15 MG CAPSULE DELAYED RELEASE Take 1 Cap by mouth every day. (Patient not taking: Reported on 4/26/2017) 60 Cap 0   • glucose blood (TRUETEST TEST) strip AS DIRECTED TEST BLOOD SUGAR 4 TIMES DAILY 100 Strip 11   • insulin glargine (LANTUS) 100 UNIT/ML Solution INJECT 5O UNITS SUBCUTANEOUSLY AS DIRECTED ONCE DAILY 10 mL 11   • INSULIN SYRINGE .5CC/29G (ULTICARE INSULIN SYRINGE) 29G X 1/2\" 0.5 ML Misc To use twice a day with insulin 60 Each 2   • [DISCONTINUED] oxycodone-acetaminophen (PERCOCET) 7.5-325 MG per tablet Take 1 Tab by mouth every 6 hours as needed for Moderate Pain (n). (Patient not taking: Reported on 4/26/2017) 30 Tab 0   • [DISCONTINUED] VENTOLIN  (90 BASE) MCG/ACT Aero Soln inhalation aerosol INHALE 2 PUFFS ORALLY EVERY 4 HOURS IF NEEDED FOR SHORTNESS OF BREATH (Patient not taking: Reported on 4/26/2017) 1 Inhaler 2   • INSULIN SYRINGE .5CC/29G (ULTICARE INSULIN " "SYRINGE) 29G X 1/2\" 0.5 ML Misc USE AS DIRECTED BY PHYSICIAN 60 Each 0   • Misc. Devices Misc True metrix glucometer and teststrips to check blood sugars qid #120 120 Device 6   • [DISCONTINUED] insulin glargine (LANTUS) 100 UNIT/ML Solution Inject 40 Units as instructed every day. Increase by 2 u if blood sugars are above 180 (Patient not taking: Reported on 4/26/2017) 10 mL 6   • [DISCONTINUED] senna-docusate (PERICOLACE OR SENOKOT S) 8.6-50 MG Tab Take 1 Tab by mouth every day. (Patient not taking: Reported on 4/26/2017) 30 Tab 0     No facility-administered encounter medications on file as of 4/26/2017.     Review of Systems   Eyes: Positive for blurred vision.   Cardiovascular: Positive for chest pain and palpitations. Negative for orthopnea, claudication, leg swelling and PND.   Musculoskeletal: Positive for back pain (radiates down is left leg).   Neurological: Positive for tingling and sensory change (in his left leg with weakness).   Endo/Heme/Allergies: Bruises/bleeds easily.   Psychiatric/Behavioral: The patient is nervous/anxious and has insomnia.    All other systems reviewed and are negative.       Objective:   /80 mmHg  Pulse 84  Ht 1.778 m (5' 10\")  Wt 86.637 kg (191 lb)  BMI 27.41 kg/m2  SpO2 93%    Physical Exam   Constitutional: He is oriented to person, place, and time. He appears well-developed and well-nourished. No distress.   Somewhat gruff, elderly-appearing man though pleasant in no distress   HENT:   Head: Normocephalic and atraumatic.   Eyes: Conjunctivae and EOM are normal. Pupils are equal, round, and reactive to light. No scleral icterus.   Neck: Neck supple. No JVD present. No tracheal deviation present.   Cardiovascular: Normal rate, regular rhythm, normal heart sounds and intact distal pulses.  Exam reveals no gallop and no friction rub.    No murmur heard.  Pulses:       Dorsalis pedis pulses are 2+ on the right side, and 1+ on the left side.   No carotid bruits, healed " "midline sternotomy scar noted   Pulmonary/Chest: Effort normal. No stridor. No respiratory distress. He has no wheezes. He has no rales.   Abdominal: Soft. Bowel sounds are normal. He exhibits no distension.   Musculoskeletal: He exhibits edema (1+ on the left, trace on the right with woody chronic venous stasis changes of his left lower extremity).   4-5 strength with plantar flexion of his left foot, 5 out of 5 strength with plantar flexion of his right foot. Mildly decreased sensation on his left lateral leg.   Neurological: He is alert and oriented to person, place, and time.   Full exam deferred   Skin: Skin is warm and dry. He is not diaphoretic. No erythema. No pallor.   Venous stasis changes left greater than right as described above   Psychiatric: He has a normal mood and affect. Judgment and thought content normal.   Vitals reviewed.       4/26/2016 01:30 12/19/2016 11:47   Cholesterol,Tot 81 (L) 113   Triglycerides 59 64   HDL 21 (A) 32 (A)   LDL 48 68     Echocardiogram, 4/26/2016:  \"CONCLUSIONS  Normal left ventricular chamber size. Moderate concentric left   ventricular hypertrophy. Severely reduced left ventricular systolic   function. Left ventricular ejection fraction is visually estimated to   be 20%. Left ventricular ejection fraction is visually estimated to be   20%. Apical akinesis. 3 ML of contrast was administered.  No significant valve abnormalities.   No prior study is available for comparison.\"    Ankle-brachial indices, 1/24/2017:  \" Findings   Bilateral.    Doppler waveforms at the ankle are brisk and biphasic.    Ankle-brachial index is normal.    Active pedal plantar flexion (toe-up) exercise was performed for 50    repetitions(goal 50).    Post exercise ankle/brachial index:              Right:       0.96            Left:.      1.06   Additional testing was not performed in accordance with lower extremity    arterial evaluation protocol\"      Assessment:     1. S/P CABG x 4     2. " Chronic pain syndrome     3. Other chest pain  HOLTER MONITOR STUDY    NM-CARDIAC STRESS TEST   4. Coronary artery disease involving coronary bypass graft of native heart without angina pectoris  NM-CARDIAC STRESS TEST   5. Palpitations  HOLTER MONITOR STUDY   6. Essential hypertension     7. Paroxysmal atrial fibrillation (CMS-HCC)     8. Cardiomyopathy (CMS-HCC)     9. Dyslipidemia         Medical Decision Making:  Today's Assessment / Status / Plan:     Medical Decision Making:    From the cardiovascular perspective his blood pressure is well controlled as are his lipids on medication though his blood sugars continue to be very poorly controlled with a recent A1c above 10 (which seems to be related to non-adherence to insulin). He continues to relate quite a bit of limitation related to his left lower extremity pain. I reviewed with him ankle brachial indices that I had ordered at our last appointment, which document essentially normal blood flow to his leg. I suspect that his leg pain is related to lumbar degenerative disc disease. He again perseverates on the idea that this is all the result of his bypass surgery, which I do not think is the case. He does also tell me about an odd sensation in his chest with lifting heavy weights that is concerning for potential angina versus arrhythmia. I worked that up with myocardial perfusion imaging, and a Holter monitor. I do not suspect that this represents loss of any of his bypass grafts or a true arrhythmia. He is, however, pushing for permanent disability. I do not suspect that he will meet criteria for that based on his cardiovascular disease as he is completely revascularized and on good medical management. I encouraged him to obtain the MRI previously ordered to evaluate his lumbar degenerative disc disease.    Andrew Kelley MD  Cardiologist, [facility] [place]

## 2017-04-27 NOTE — PROGRESS NOTES
Name:          Aravind Quinones   YOB: 1955  Date:     4/26/2017      Kitty Ang M.D.  [address]  [place] [address]     Andrew Kelley MD  [address]  [place] [address]  Phone: [phone]  Back Line: [phone]  Fax: [phone]  E-mail: [email or web address]   Dear Dr. Ang,    We had the pleasure of seeing your patient, Aravind Quinones, in Cardiology Clinic at [facility] today.    As you know, he is a 61-year-old man with a history of 4-V CABG 4/25/16 (LIMA-LAD, SVG-diag, SVG-OM, SVG-PDA), type II diabetes, hypertension, and dyslipidemia. He was a long-time smoker before bypass and has since quit.    From the cardiovascular perspective his blood pressure is well controlled as are his lipids on medication though his blood sugars continue to be very poorly controlled with a recent A1c above 10 (which seems to be related to non-adherence to insulin). He continues to relate quite a bit of limitation related to his left lower extremity pain. I reviewed with him ankle brachial indices that I had ordered at our last appointment, which document essentially normal blood flow to his leg. I suspect that his leg pain is related to lumbar degenerative disc disease. He again perseverates on the idea that this is all the result of his bypass surgery, which I do not think is the case. He does also tell me about an odd sensation in his chest with lifting heavy weights that is concerning for potential angina versus arrhythmia. I worked that up with myocardial perfusion imaging, and a Holter monitor. I do not suspect that this represents loss of any of his bypass grafts or a true arrhythmia. He is, however, pushing for permanent disability. I do not suspect that he will meet criteria for that based on his cardiovascular disease as he is completely revascularized and on good medical management. I encouraged him to obtain the MRI previously ordered to evaluate  his lumbar degenerative disc disease.    We will have the patient follow-up in 3 months.    Thank you for the referral and please do not hesitate to contact me at any time. My contact information is listed above.    This note was dictated using Dragon speech recognition software.     A full note including my physical examination and a full list of rectified medications is available in our medical record, and can be faxed as well.    Andrew Kelley MD  Cardiologist  [facility]

## 2017-05-03 ENCOUNTER — APPOINTMENT (OUTPATIENT)
Dept: INTERNAL MEDICINE | Facility: MEDICAL CENTER | Age: 62
End: 2017-05-03
Payer: MEDICAID

## 2017-05-12 ENCOUNTER — TELEPHONE (OUTPATIENT)
Dept: INTERNAL MEDICINE | Facility: MEDICAL CENTER | Age: 62
End: 2017-05-12

## 2017-05-12 NOTE — TELEPHONE ENCOUNTER
Pharmacy Name: [facility]    Pharmacy Phone#: [phone]    Pharmacy Fax#: [phone]    Request received via: fax    Message: pharmacy asking for lantus to be changed to Basaglar due to insurance preference please order if appropriate and send to pharmacy

## 2017-05-17 DIAGNOSIS — E11.8 DM (DIABETES MELLITUS) WITH COMPLICATIONS (HCC): ICD-10-CM

## 2017-05-22 ENCOUNTER — TELEPHONE (OUTPATIENT)
Dept: INTERNAL MEDICINE | Facility: MEDICAL CENTER | Age: 62
End: 2017-05-22

## 2017-05-22 DIAGNOSIS — E11.49 TYPE 2 DIABETES MELLITUS WITH OTHER NEUROLOGIC COMPLICATION: ICD-10-CM

## 2017-05-22 DIAGNOSIS — E11.8 DM (DIABETES MELLITUS) WITH COMPLICATIONS (HCC): ICD-10-CM

## 2017-05-22 NOTE — TELEPHONE ENCOUNTER
1. Caller Name: Aravind Quinones                      Call Back Number: [phone] (home)       2. Message[facility] please change Basaglar or Toujeo.    3. Patient approves office to leave a detailed voicemail/MyChart message: yes    Patient needs pen and neddles too.

## 2017-05-22 NOTE — TELEPHONE ENCOUNTER
Last seen: 02/22/17 by Dr. Ang  Next appt: 06/07/17 with Dr. Ang    Was the patient seen in the last year in this department? Yes   Does patient have an active prescription for medications requested? No   Received Request Via: Pharmacy        Last request failed to send to pharmacy

## 2017-05-23 ENCOUNTER — HOSPITAL ENCOUNTER (OUTPATIENT)
Dept: RADIOLOGY | Facility: MEDICAL CENTER | Age: 62
End: 2017-05-23
Attending: INTERNAL MEDICINE
Payer: MEDICAID

## 2017-05-23 DIAGNOSIS — E11.49 TYPE 2 DIABETES MELLITUS WITH OTHER NEUROLOGIC COMPLICATION: ICD-10-CM

## 2017-05-23 DIAGNOSIS — R20.0 NUMBNESS: ICD-10-CM

## 2017-05-23 DIAGNOSIS — G89.29 OTHER CHRONIC PAIN: ICD-10-CM

## 2017-05-23 PROCEDURE — 72148 MRI LUMBAR SPINE W/O DYE: CPT

## 2017-05-24 ENCOUNTER — TELEPHONE (OUTPATIENT)
Dept: INTERNAL MEDICINE | Facility: MEDICAL CENTER | Age: 62
End: 2017-05-24

## 2017-05-24 DIAGNOSIS — M54.5 LOW BACK PAIN, UNSPECIFIED BACK PAIN LATERALITY, UNSPECIFIED CHRONICITY, WITH SCIATICA PRESENCE UNSPECIFIED: ICD-10-CM

## 2017-06-05 DIAGNOSIS — E11.8 DM (DIABETES MELLITUS) WITH COMPLICATIONS (HCC): ICD-10-CM

## 2017-06-05 RX ORDER — METFORMIN HYDROCHLORIDE 500 MG/1
500 TABLET, EXTENDED RELEASE ORAL
Qty: 30 TAB | Refills: 2 | Status: SHIPPED | OUTPATIENT
Start: 2017-06-05 | End: 2017-09-06 | Stop reason: SDUPTHER

## 2017-06-05 NOTE — TELEPHONE ENCOUNTER
Was the patient seen in the last year in this department? Yes  Pt last seen on: 02/22/2017 by Dr. Ang Next appt on: 06/07/2017      Does patient have an active prescription for medications requested? No     Received Request Via: Pharmacy

## 2017-06-07 ENCOUNTER — OFFICE VISIT (OUTPATIENT)
Dept: INTERNAL MEDICINE | Facility: MEDICAL CENTER | Age: 62
End: 2017-06-07
Payer: MEDICAID

## 2017-06-07 VITALS
RESPIRATION RATE: 18 BRPM | OXYGEN SATURATION: 94 % | HEIGHT: 70 IN | WEIGHT: 183.4 LBS | BODY MASS INDEX: 26.26 KG/M2 | TEMPERATURE: 97.8 F | HEART RATE: 76 BPM | DIASTOLIC BLOOD PRESSURE: 72 MMHG | SYSTOLIC BLOOD PRESSURE: 126 MMHG

## 2017-06-07 DIAGNOSIS — E11.42 DIABETIC POLYNEUROPATHY ASSOCIATED WITH TYPE 2 DIABETES MELLITUS (HCC): ICD-10-CM

## 2017-06-07 PROCEDURE — 99213 OFFICE O/P EST LOW 20 MIN: CPT | Mod: GE | Performed by: INTERNAL MEDICINE

## 2017-06-07 RX ORDER — GABAPENTIN 300 MG/1
300 CAPSULE ORAL 3 TIMES DAILY
Qty: 90 CAP | Refills: 2 | Status: SHIPPED | OUTPATIENT
Start: 2017-06-07 | End: 2017-09-06 | Stop reason: SDUPTHER

## 2017-06-07 ASSESSMENT — PAIN SCALES - GENERAL: PAINLEVEL: 8=MODERATE-SEVERE PAIN

## 2017-06-07 ASSESSMENT — PATIENT HEALTH QUESTIONNAIRE - PHQ9: CLINICAL INTERPRETATION OF PHQ2 SCORE: 0

## 2017-06-07 NOTE — PROGRESS NOTES
Established Patient    Aravind presents today with the following:    CC: review the MRI result and left leg pain    HPI:   Aravind Quinones is a 61 y.o. male with past history of Diabetes mellitus type 2 presented to the clinic to review MRI result, patient informed regarding the result of MRI showed minimal to mild degenerative changes in the lumber area, explain to him that his pain and numbness in his left leg may related to his neuropathy as the patient describe the pain as needle and sharp mainly on the left leg and feet however it affect the right leg too, patient was able to come to the office without using cane or wheelchair, claim that his leg pain started after the CABG surgery done 14 month ago as he reported, patient was thinking about applying for disability, agree to retry the gabapentin and re-evaluated in the next three months. Patient already evaluated to the pain management clinic since the last visit.     Review of Systems:     Constitutional: Denies fevers, Denies weight changes  Eyes: Denies changes in vision, no eye pain  Ears/Nose/Throat/Mouth: Denies nasal congestion or sore throat   Cardiovascular: Denies chest pain or palpitations   Respiratory: Denies shortness of breath , Denies cough  Gastrointestinal/Hepatic: Denies abdominal pain, nausea, vomiting, diarrhea or constipation.  Genitourinary: Denies bladder dysfunction, dysuria or frequency  Musculoskeletal/Rheum: per H and P  Skin: Denies rash.  Neurological: Denies headache, confusion, memory loss   Psychiatric: denies mood disorder         Patient Active Problem List    Diagnosis Date Noted   • Atrial fibrillation (CMS-HCC) 05/04/2016     Priority: High     Class: Acute   • 4-V CABG 4/25/16 (LIMA-LAD, SVG-diag, SVG-OM, SVG-PDA) 05/02/2016     Priority: High   • STEMI (ST elevation myocardial infarction) (CMS-HCC) 04/25/2016     Priority: High   • Anemia 05/04/2016     Priority: Medium     Class: Acute   • Leukocytosis 05/04/2016      "Priority: Medium     Class: Acute   • Peripheral neuropathy 05/04/2016     Priority: Medium     Class: Acute   • Protein calorie malnutrition (CMS-HCC) 05/04/2016     Priority: Medium     Class: Acute   • HTN (hypertension) 05/29/2012     Priority: Medium   • DM (diabetes mellitus) (CMS-HCC) 05/22/2012     Priority: Medium   • Achilles tendon infection 02/10/2015     Priority: Low   • Dyslipidemia, goal LDL below 70 05/29/2012     Priority: Low   • Vitamin D insufficiency 05/29/2012     Priority: Low   • Dyslipidemia 04/26/2017   • Coronary artery disease involving coronary bypass graft of native heart without angina pectoris 07/15/2016   • DM (diabetes mellitus) type II controlled, neurological manifestation (CMS-HCC) 07/06/2016   • Chronic pain 07/06/2016   • Cardiomyopathy (CMS-HCC) 05/31/2016   • Open wound of knee, leg (except thigh), and ankle, with tendon involvement 02/25/2015   • DDD (degenerative disc disease) 02/14/2013   • Arthritis of ankle joint 02/14/2013   • Open wound 02/14/2013   • Family history of diabetes mellitus 05/22/2012       Current Outpatient Prescriptions   Medication Sig Dispense Refill   • gabapentin (NEURONTIN) 300 MG Cap Take 1 Cap by mouth 3 times a day. 90 Cap 2   • metformin ER (GLUCOPHAGE XR) 500 MG TABLET SR 24 HR Take 1 Tab by mouth every bedtime. 30 Tab 2   • insulin glargine (BASAGLAR KWIKPEN) 100 UNIT/ML Solution Pen-injector injection Inject 50 Units as instructed every evening for 30 days. 15 mL 4   • Insulin Pen Needle 30G X 5 MM Misc Inject 1 Each as instructed every evening. 30 Each 4   • insulin glargine (LANTUS) 100 UNIT/ML Solution Pen-injector injection Inject 50 Units as instructed every evening for 30 days. 15 mL 4   • glucose blood (TRUETEST TEST) strip AS DIRECTED TEST BLOOD SUGAR 4 TIMES DAILY 100 Strip 11   • INSULIN SYRINGE .5CC/29G (ULTICARE INSULIN SYRINGE) 29G X 1/2\" 0.5 ML Misc To use twice a day with insulin 60 Each 2   • atorvastatin (LIPITOR) 40 MG Tab " "Take 1 Tab by mouth every bedtime. 90 Tab 3   • amlodipine (NORVASC) 10 MG Tab Take 1 Tab by mouth every day. 90 Tab 3   • lisinopril (PRINIVIL) 10 MG Tab Take 1 Tab by mouth every day. 90 Tab 3   • carvedilol (COREG) 3.125 MG Tab Take 1 Tab by mouth 2 times a day, with meals. 180 Tab 3   • clopidogrel (PLAVIX) 75 MG Tab Take 1 Tab by mouth every day. 90 Tab 3   • aspirin (ASPIRIN LOW DOSE) 81 MG EC tablet TAKE 1 TABLET ORALLY ONCE DAILY 30 Tab 5   • INSULIN SYRINGE .5CC/29G (ULTICARE INSULIN SYRINGE) 29G X 1/2\" 0.5 ML Misc USE AS DIRECTED BY PHYSICIAN 60 Each 0   • Misc. Devices Misc True metrix glucometer and teststrips to check blood sugars qid #120 120 Device 6   • Cholecalciferol 2000 UNIT Cap Take 1 Cap by mouth every morning. 30 Cap 6     No current facility-administered medications for this visit.       /72 mmHg  Pulse 76  Temp(Src) 36.6 °C (97.8 °F)  Resp 18  Ht 1.778 m (5' 10\")  Wt 83.19 kg (183 lb 6.4 oz)  BMI 26.32 kg/m2  SpO2 94%    Physical Exam   General:  Alert and oriented, No apparent distress.  Eyes: Pupils equal and reactive. No scleral icterus.  Throat: Clear no erythema or exudates noted.  Neck: Supple. No lymphadenopathy noted. Thyroid not enlarged.  Lungs: Clear to auscultation bilaterally. No wheezes, rhonchi or crackles.  Cardiovascular: scar from CABG Regular rate and rhythm. No murmurs, rubs or gallops.  Abdomen:  Soft, non tender, non distended. Bowel sounds positive.  Extremities: No clubbing, cyanosis, edema. Scar on the left ankle from the tendon surgery  Patient limb with walking.  Skin: Clear. No rash or suspicious skin lesions noted.      Assessment and Plan    1. Diabetic polyneuropathy associated with type 2 diabetes mellitus (CMS-HCC)  - Pain sharp like needle   - Patient with history of diabetes type 2 on insulin Lantuss 50 unit and 500 mg of metformin   - patient requesting disability as he stats that he is unable to go back to work with this pain       Plan  - " patient agree to retry the gabapentin for now  - normal renal function since the lab test was done 6 months ago( Dec)  - gabapentin (NEURONTIN) 300 MG Cap; Take 1 Cap by mouth 3 times a day.  Dispense: 90 Cap; Refill: 2      Signed by: Kitty Ang M.D.

## 2017-06-07 NOTE — MR AVS SNAPSHOT
"Aravind Quinones   2017 3:30 PM   Office Visit   MRN: [id number]    Department:  [facility] Med - Internal Med   Dept Phone:  [phone]    Description:  Male : 1955   Provider:  Kitty Ang M.D.           Reason for Visit     Diabetes lipid    Overdue Lab And Imaging Result Follow-up review mri      Allergies as of 2017     Allergen Noted Reactions    Sulfa Drugs 2012       ?; pt unsure of reaction      Vital Signs     Blood Pressure Pulse Temperature Respirations Height Weight    126/72 mmHg 76 36.6 °C (97.8 °F) 18 1.778 m (5' 10\") 83.19 kg (183 lb 6.4 oz)    Body Mass Index Oxygen Saturation Smoking Status             26.32 kg/m2 94% Former Smoker         Basic Information     Date Of Birth Sex Race Ethnicity Preferred Language    1955 Male White Non- English      Your appointments     Aug 18, 2017  3:15 PM   FOLLOW UP with Andrew Kelley M.D.   [facility] (--)    [address] [place] [address] [address]  [facility] [address] [phone]            Sep 06, 2017  2:00 PM   Established Patient with Kitty Ang M.D.   [facility] - Internal Medicine (--)    [address] [place] [address]  [address]  [facility] [address] [phone]           You will be receiving a confirmation call a few days before your appointment from our automated call confirmation system.              Problem List              ICD-10-CM Priority Class Noted - Resolved    DM (diabetes mellitus) (CMS-HCC) E11.9 Medium  2012 - Present    Family history of diabetes mellitus Z83.3   2012 - Present    Dyslipidemia, goal LDL below 70 E78.5 Low  2012 - Present    Vitamin D insufficiency E55.9 Low  2012 - Present    HTN (hypertension) I10 Medium  2012 - Present    DDD (degenerative disc disease) [id number]   2013 - Present    Arthritis of ankle joint M19.079   2013 - Present    Open wound T14.8   2013 - Present    Achilles " tendon infection M65.10 Low  2/10/2015 - Present    Open wound of knee, leg (except thigh), and ankle, with tendon involvement S81.009A, S81.809A, S91.009A   2/25/2015 - Present    STEMI (ST elevation myocardial infarction) (CMS-HCC) I21.3 High  4/25/2016 - Present    4-V CABG 4/25/16 (LIMA-LAD, SVG-diag, SVG-OM, SVG-PDA) Z95.1 High  5/2/2016 - Present    Anemia D64.9 Medium Acute 5/4/2016 - Present    Atrial fibrillation (CMS-HCC) I48.91 High Acute 5/4/2016 - Present    Leukocytosis D72.829 Medium Acute 5/4/2016 - Present    Peripheral neuropathy G62.9 Medium Acute 5/4/2016 - Present    Protein calorie malnutrition (CMS-HCC) E46 Medium Acute 5/4/2016 - Present    Cardiomyopathy (CMS-HCC) I42.9   5/31/2016 - Present    DM (diabetes mellitus) type II controlled, neurological manifestation (CMS-HCC) E11.49   7/6/2016 - Present    Chronic pain G89.29   7/6/2016 - Present    Coronary artery disease involving coronary bypass graft of native heart without angina pectoris I25.810   7/15/2016 - Present    Dyslipidemia E78.5   4/26/2017 - Present      Health Maintenance        Date Due Completion Dates    RETINAL SCREENING 7/12/1971 ---    IMM ZOSTER VACCINE 7/12/2013 ---    A1C SCREENING 6/19/2017 12/19/2016, 9/19/2016, 5/23/2016, 3/7/2016, 12/29/2015, 8/31/2015, 4/30/2015, 2/23/2015, 12/12/2014, 9/2/2014, 3/22/2014, 9/10/2013, 6/10/2013, 11/2/2012, 8/27/2012, 6/25/2012, 5/23/2012    DIABETES MONOFILAMENT / LE EXAM 10/27/2017 10/27/2016    FASTING LIPID PROFILE 12/19/2017 12/19/2016, 4/26/2016, 4/30/2015, 2/23/2015, 3/22/2014, 9/10/2013, 6/10/2013, 12/18/2012, 11/2/2012, 8/27/2012, 5/23/2012    URINE ACR / MICROALBUMIN 12/19/2017 12/19/2016, 9/19/2016, 5/23/2016, 3/7/2016, 8/31/2015, 2/23/2015, 12/12/2014, 5/23/2012    SERUM CREATININE 12/19/2017 12/19/2016, 9/19/2016, 5/23/2016, 5/7/2016, 5/3/2016, 5/2/2016, 5/1/2016, 4/30/2016, 4/29/2016, 4/28/2016, 4/27/2016, 4/26/2016, 4/25/2016, 4/25/2016, 4/30/2015, 4/7/2015,  3/31/2015, 3/24/2015, 3/17/2015, 3/10/2015, 3/3/2015, 2/23/2015, 12/12/2014, 3/22/2014, 9/10/2013, 6/10/2013, 2/11/2013, 11/2/2012, 8/27/2012, 6/29/2012, 6/25/2012, 5/23/2012    IMM DTaP/Tdap/Td Vaccine (2 - Td) 9/10/2025 9/10/2015    COLONOSCOPY 4/20/2027 4/20/2017 (N/S)    Override on 4/20/2017: (N/S) (PER GIC AND C NO COLONOSCOPY)            Current Immunizations     Influenza TIV (IM) 10/17/2013, 11/7/2012    Influenza Vaccine Quad Inj (Preserved) 10/27/2016, 10/20/2015, 12/15/2014  1:14 PM    Pneumococcal polysaccharide vaccine (PPSV-23) 10/27/2016    Tdap Vaccine 9/10/2015      Below and/or attached are the medications your provider expects you to take. Review all of your home medications and newly ordered medications with your provider and/or pharmacist. Follow medication instructions as directed by your provider and/or pharmacist. Please keep your medication list with you and share with your provider. Update the information when medications are discontinued, doses are changed, or new medications (including over-the-counter products) are added; and carry medication information at all times in the event of emergency situations     Allergies:  SULFA DRUGS - (reactions not documented)               Medications  Valid as of: June 07, 2017 -  4:23 PM    Generic Name Brand Name Tablet Size Instructions for use    AmLODIPine Besylate (Tab) NORVASC 10 MG Take 1 Tab by mouth every day.        Aspirin (Tablet Delayed Response) aspirin 81 MG TAKE 1 TABLET ORALLY ONCE DAILY        Atorvastatin Calcium (Tab) LIPITOR 40 MG Take 1 Tab by mouth every bedtime.        Carvedilol (Tab) COREG 3.125 MG Take 1 Tab by mouth 2 times a day, with meals.        Cholecalciferol (Cap) Cholecalciferol 2000 UNIT Take 1 Cap by mouth every morning.        Clopidogrel Bisulfate (Tab) PLAVIX 75 MG Take 1 Tab by mouth every day.        Glucose Blood (Strip) glucose blood  AS DIRECTED TEST BLOOD SUGAR 4 TIMES DAILY        Insulin Glargine  "(Solution Pen-injector) LANTUS 100 UNIT/ML Inject 50 Units as instructed every evening for 30 days.        Insulin Glargine (Solution Pen-injector) LANTUS 100 UNIT/ML Inject 50 Units as instructed every evening for 30 days.        Insulin Pen Needle (Misc) Insulin Pen Needle 30G X 5 MM Inject 1 Each as instructed every evening.        Insulin Syringe-Needle U-100 (Misc) INSULIN SYRINGE .5CC/29G 29G X 1/2\" 0.5 ML USE AS DIRECTED BY PHYSICIAN        Insulin Syringe-Needle U-100 (Misc) INSULIN SYRINGE .5CC/29G 29G X 1/2\" 0.5 ML To use twice a day with insulin        Lisinopril (Tab) PRINIVIL 10 MG Take 1 Tab by mouth every day.        MetFORMIN HCl (TABLET SR 24 HR) GLUCOPHAGE  MG Take 1 Tab by mouth every bedtime.        Misc. Devices (Misc) Misc. Devices  True metrix glucometer and teststrips to check blood sugars qid #120        .                 Medicines prescribed today were sent to:     [facility][id number] - [place] - [address] [address]    [address] [address] [place] [address]    Phone: [phone] Fax: [phone]    Open 24 Hours?: No      Medication refill instructions:       If your prescription bottle indicates you have medication refills left, it is not necessary to call your provider’s office. Please contact your pharmacy and they will refill your medication.    If your prescription bottle indicates you do not have any refills left, you may request refills at any time through one of the following ways: The online [employer] system (except Urgent Care), by calling your provider’s office, or by asking your pharmacy to contact your provider’s office with a refill request. Medication refills are processed only during regular business hours and may not be available until the next business day. Your provider may request additional information or to have a follow-up visit with you prior to refilling your medication.   *Please Note: Medication refills are assigned a new Rx number when refilled " electronically. Your pharmacy may indicate that no refills were authorized even though a new prescription for the same medication is available at the pharmacy. Please request the medicine by name with the pharmacy before contacting your provider for a refill.           MyChart Status: Patient Declined

## 2017-08-06 DIAGNOSIS — I25.2 HISTORY OF ST ELEVATION MYOCARDIAL INFARCTION (STEMI): ICD-10-CM

## 2017-08-06 DIAGNOSIS — Z95.1 HX OF CABG: ICD-10-CM

## 2017-08-07 RX ORDER — ASPIRIN 81 MG/1
81 TABLET ORAL DAILY
Qty: 30 TAB | Refills: 11 | OUTPATIENT
Start: 2017-08-07 | End: 2017-09-06 | Stop reason: SDUPTHER

## 2017-08-18 ENCOUNTER — OFFICE VISIT (OUTPATIENT)
Dept: CARDIOLOGY | Facility: MEDICAL CENTER | Age: 62
End: 2017-08-18
Payer: MEDICAID

## 2017-08-18 VITALS
BODY MASS INDEX: 27.63 KG/M2 | DIASTOLIC BLOOD PRESSURE: 60 MMHG | OXYGEN SATURATION: 93 % | HEIGHT: 70 IN | HEART RATE: 78 BPM | WEIGHT: 193 LBS | SYSTOLIC BLOOD PRESSURE: 90 MMHG

## 2017-08-18 DIAGNOSIS — I25.708 CORONARY ARTERY DISEASE OF BYPASS GRAFT OF NATIVE HEART WITH STABLE ANGINA PECTORIS (HCC): ICD-10-CM

## 2017-08-18 DIAGNOSIS — E78.5 DYSLIPIDEMIA: ICD-10-CM

## 2017-08-18 DIAGNOSIS — R07.89 OTHER CHEST PAIN: ICD-10-CM

## 2017-08-18 DIAGNOSIS — I10 ESSENTIAL HYPERTENSION: ICD-10-CM

## 2017-08-18 DIAGNOSIS — Z95.1 S/P CABG X 4: Primary | ICD-10-CM

## 2017-08-18 PROCEDURE — 99214 OFFICE O/P EST MOD 30 MIN: CPT | Performed by: INTERNAL MEDICINE

## 2017-08-18 RX ORDER — MECOBALAMIN 5000 MCG
15 TABLET,DISINTEGRATING ORAL DAILY
COMMUNITY
End: 2018-01-30 | Stop reason: SDUPTHER

## 2017-08-18 ASSESSMENT — ENCOUNTER SYMPTOMS
SENSORY CHANGE: 1
TINGLING: 1
BACK PAIN: 1
INSOMNIA: 1
BRUISES/BLEEDS EASILY: 1
NERVOUS/ANXIOUS: 1
BLURRED VISION: 1

## 2017-08-18 NOTE — PROGRESS NOTES
"Subjective:   Aravind Quinones is a 62 -year-old man with a history of 4-V CABG 4/25/16 (LIMA-LAD, SVG-diag, SVG-OM, SVG-PDA), type II diabetes, hypertension, and dyslipidemia. He was a long-time smoker before bypass and has since quit.    He is doing seemingly better today, and denies orthostasis in conjunction with his blood pressure of 90/60 mmHg as below. He does continue to have a sensation that he describes as a \"jiggling\" chest discomfort periodically mostly with motion. Apart from that, he continues to perseverate on his left leg and degenerative disc disease. He had his MRI, and that didn't show too much degenerative disc disease. He also has had several surgeries of his ankle and Achilles tendon in the past on that.    He made some small talk about his son wanting a tattoo.    Past Medical History   Diagnosis Date   • Type II or unspecified type diabetes mellitus without mention of complication, not stated as uncontrolled      IDDM   • Arthritis      L ankle-Osteo   • Pain 02/24/15     L ankle-chronic=7/10   • Hypertension      controlled with meds   • High cholesterol      contolled with meds   • STEMI (ST elevation myocardial infarction) (CMS-[facility]) 4/25/2016   • Tobacco abuse 5/29/2012   • 4-V CABG 4/25/16 (LIMA-LAD, SVG-diag, SVG-OM, SVG-PDA) 5/2/2016     Past Surgical History   Procedure Laterality Date   • Achilles tendon repair  1995   • Flexor tendon repair  2/25/2015     Performed by Hiram Enciso M.D. at SURGERY [facility]   • Irrigation & debridement ortho  2/25/2015     Performed by Hiram Enciso M.D. at [facility]   • Multiple coronary artery bypass endo vein harvest  4/25/2016     Procedure: MULTIPLE CORONARY ARTERY BYPASS ENDO VEIN HARVEST- X4;  Surgeon: Medardo Goodson M.D.;  Location: [facility];  Service:      Family History   Problem Relation Age of Onset   • Diabetes Mother    • Heart Disease Father    • Stroke Father      History   Smoking " "status   • Former Smoker -- 0.25 packs/day for 12 years   • Types: Cigarettes   • Quit date: 04/03/2016   Smokeless tobacco   • Never Used     Allergies   Allergen Reactions   • Sulfa Drugs      ?; pt unsure of reaction     Outpatient Encounter Prescriptions as of 8/18/2017   Medication Sig Dispense Refill   • lansoprazole (PREVACID) 15 MG CAPSULE DELAYED RELEASE Take 15 mg by mouth every day.     • aspirin 81 MG EC tablet Take 1 Tab by mouth every day. 30 Tab 11   • gabapentin (NEURONTIN) 300 MG Cap Take 1 Cap by mouth 3 times a day. 90 Cap 2   • metformin ER (GLUCOPHAGE XR) 500 MG TABLET SR 24 HR Take 1 Tab by mouth every bedtime. 30 Tab 2   • atorvastatin (LIPITOR) 40 MG Tab Take 1 Tab by mouth every bedtime. 90 Tab 3   • amlodipine (NORVASC) 10 MG Tab Take 1 Tab by mouth every day. 90 Tab 3   • lisinopril (PRINIVIL) 10 MG Tab Take 1 Tab by mouth every day. 90 Tab 3   • clopidogrel (PLAVIX) 75 MG Tab Take 1 Tab by mouth every day. 90 Tab 3   • Cholecalciferol 2000 UNIT Cap Take 1 Cap by mouth every morning. 30 Cap 6   • Insulin Pen Needle 30G X 5 MM Misc Inject 1 Each as instructed every evening. 30 Each 4   • glucose blood (TRUETEST TEST) strip AS DIRECTED TEST BLOOD SUGAR 4 TIMES DAILY 100 Strip 11   • INSULIN SYRINGE .5CC/29G (ULTICARE INSULIN SYRINGE) 29G X 1/2\" 0.5 ML Misc To use twice a day with insulin 60 Each 2   • [DISCONTINUED] carvedilol (COREG) 3.125 MG Tab Take 1 Tab by mouth 2 times a day, with meals. 180 Tab 3   • INSULIN SYRINGE .5CC/29G (ULTICARE INSULIN SYRINGE) 29G X 1/2\" 0.5 ML Misc USE AS DIRECTED BY PHYSICIAN 60 Each 0   • Misc. Devices Misc True metrix glucometer and teststrips to check blood sugars qid #120 120 Device 6     No facility-administered encounter medications on file as of 8/18/2017.     Review of Systems   Eyes: Positive for blurred vision.   Cardiovascular: Positive for chest pain.   Musculoskeletal: Positive for back pain (radiates down is left leg).   Neurological: Positive " "for tingling and sensory change (in his left leg with weakness).   Endo/Heme/Allergies: Bruises/bleeds easily.   Psychiatric/Behavioral: The patient is nervous/anxious and has insomnia.    All other systems reviewed and are negative.       Objective:   BP 90/60 mmHg  Pulse 78  Ht 1.778 m (5' 10\")  Wt 87.544 kg (193 lb)  BMI 27.69 kg/m2  SpO2 93%    Physical Exam   Constitutional: He is oriented to person, place, and time. He appears well-developed and well-nourished. No distress.   Somewhat gruff, elderly-appearing man though pleasant in no distress   HENT:   Head: Normocephalic and atraumatic.   Eyes: Conjunctivae and EOM are normal. Pupils are equal, round, and reactive to light. No scleral icterus.   Neck: Neck supple. No JVD present. No tracheal deviation present.   Cardiovascular: Normal rate, regular rhythm, normal heart sounds and intact distal pulses.  Exam reveals no gallop and no friction rub.    No murmur heard.  Pulses:       Dorsalis pedis pulses are 2+ on the right side, and 1+ on the left side.   No carotid bruits, healed midline sternotomy scar noted   Pulmonary/Chest: Effort normal. No stridor. No respiratory distress. He has no wheezes. He has no rales.   Abdominal: Soft. Bowel sounds are normal. He exhibits no distension.   Musculoskeletal: He exhibits no edema.   4-5 strength with plantar flexion of his left foot, 5 out of 5 strength with plantar flexion of his right foot. Mildly decreased sensation on his left lateral leg.   Neurological: He is alert and oriented to person, place, and time.   Full exam deferred   Skin: Skin is warm and dry. He is not diaphoretic. No erythema. No pallor.   Venous stasis changes left greater than right   Psychiatric: He has a normal mood and affect. Judgment and thought content normal.   Vitals reviewed.    Lab Results   Component Value Date/Time    WBC 10.8 05/07/2016 09:50 AM    RBC 4.21* 05/07/2016 09:50 AM    HEMOGLOBIN 12.4* 05/07/2016 09:50 AM    " "HEMATOCRIT 38.0* 05/07/2016 09:50 AM    MCV 90.3 05/07/2016 09:50 AM    MCH 29.5 05/07/2016 09:50 AM    MCHC 32.6* 05/07/2016 09:50 AM    MPV 10.3 05/07/2016 09:50 AM        Lab Results   Component Value Date/Time    SODIUM 129* 12/19/2016 11:47 AM    POTASSIUM 4.3 12/19/2016 11:47 AM    CHLORIDE 98 12/19/2016 11:47 AM    CO2 24 12/19/2016 11:47 AM    GLUCOSE 337* 12/19/2016 11:47 AM    BUN 11 12/19/2016 11:47 AM    CREATININE 0.79 12/19/2016 11:47 AM        Lab Results   Component Value Date/Time    AST(SGOT) 13 12/19/2016 11:47 AM    ALT(SGPT) 15 12/19/2016 11:47 AM        Lab Results   Component Value Date/Time    CHOLESTEROL, 12/19/2016 11:47 AM    LDL 68 12/19/2016 11:47 AM    HDL 32* 12/19/2016 11:47 AM    TRIGLYCERIDES 64 12/19/2016 11:47 AM       Echocardiogram, 4/26/2016:  \"CONCLUSIONS  Normal left ventricular chamber size. Moderate concentric left   ventricular hypertrophy. Severely reduced left ventricular systolic   function. Left ventricular ejection fraction is visually estimated to   be 20%. Left ventricular ejection fraction is visually estimated to be   20%. Apical akinesis. 3 ML of contrast was administered.  No significant valve abnormalities.   No prior study is available for comparison.\"    Ankle-brachial indices, 1/24/2017:  \" Findings   Bilateral.    Doppler waveforms at the ankle are brisk and biphasic.    Ankle-brachial index is normal.    Active pedal plantar flexion (toe-up) exercise was performed for 50    repetitions(goal 50).    Post exercise ankle/brachial index:              Right:       0.96            Left:.      1.06   Additional testing was not performed in accordance with lower extremity    arterial evaluation protocol\"      Assessment:     1. 4-V CABG 4/25/16 (LIMA-LAD, SVG-diag, SVG-OM, SVG-PDA)  BASIC METABOLIC PANEL    CBC WITH DIFFERENTIAL    LIPID PANEL   2. Dyslipidemia     3. Essential hypertension     4. Coronary artery disease of bypass graft of native heart with " stable angina pectoris (CMS-HCC)     5. Other chest pain         Medical Decision Making:  Today's Assessment / Status / Plan:     He is doing overall well today, and has no cardiovascular complaints. His blood pressure is 90/60 mmHg in our office today related to which I stopped his low-dose Coreg (3.125 mg by mouth twice a day). Otherwise, I continued his cardiovascular regimen including amlodipine, lisinopril, atorvastatin 40 mg by mouth daily at bedtime, and dual antiplatelet therapy with aspirin and Plavix. He again mentions chest discomfort that is atypical for angina for which I previously ordered myocardial perfusion imaging. He did not get that test done, and I encouraged him again to get it done. Until that is done, I will not take him off of dual antiplatelet therapy though again I do not suspect that he has dropped any of his bypass grafts.    Andrew Kelley MD  Cardiologist, [facility] [place]

## 2017-08-18 NOTE — MR AVS SNAPSHOT
"        Aravind Quinones   2017 3:15 PM   Office Visit   MRN: [id number]    Department:  [facility]   Dept Phone:  [phone]    Description:  Male : 1955   Provider:  Andrew Kelley M.D.           Reason for Visit     Follow-Up           Allergies as of 2017     Allergen Noted Reactions    Sulfa Drugs 2012       ?; pt unsure of reaction      You were diagnosed with     S/P CABG x 4   [559851]       Dyslipidemia   [069776]         Vital Signs     Blood Pressure Pulse Height Weight Body Mass Index Oxygen Saturation    90/60 mmHg 78 1.778 m (5' 10\") 87.544 kg (193 lb) 27.69 kg/m2 93%    Smoking Status                   Former Smoker           Basic Information     Date Of Birth Sex Race Ethnicity Preferred Language    1955 Male White Non- English      Your appointments     Sep 06, 2017  2:00 PM   Established Patient with Kitty Ang M.D.   [facility] - Internal Medicine (--)    [address] [place] [id number] [phone]           You will be receiving a confirmation call a few days before your appointment from our automated call confirmation system.              Problem List              ICD-10-CM Priority Class Noted - Resolved    DM (diabetes mellitus) (CMS-HCC) E11.9 Medium  2012 - Present    Family history of diabetes mellitus Z83.3   2012 - Present    Dyslipidemia, goal LDL below 70 E78.5 Low  2012 - Present    Vitamin D insufficiency E55.9 Low  2012 - Present    HTN (hypertension) I10 Medium  2012 - Present    DDD (degenerative disc disease) GUS5172   2013 - Present    Arthritis of ankle joint M19.079   2013 - Present    Open wound T14.8   2013 - Present    Achilles tendon infection M65.10 Low  2/10/2015 - Present    Open wound of knee, leg (except thigh), and ankle, with tendon involvement S81.009A, S81.809A, S91.009A   2015 - Present    STEMI (ST elevation myocardial " infarction) (CMS-HCC) I21.3 High  4/25/2016 - Present    4-V CABG 4/25/16 (LIMA-LAD, SVG-diag, SVG-OM, SVG-PDA) Z95.1 High  5/2/2016 - Present    Anemia D64.9 Medium Acute 5/4/2016 - Present    Atrial fibrillation (CMS-HCC) I48.91 High Acute 5/4/2016 - Present    Leukocytosis D72.829 Medium Acute 5/4/2016 - Present    Peripheral neuropathy G62.9 Medium Acute 5/4/2016 - Present    Protein calorie malnutrition (CMS-HCC) E46 Medium Acute 5/4/2016 - Present    Cardiomyopathy (CMS-HCC) I42.9   5/31/2016 - Present    DM (diabetes mellitus) type II controlled, neurological manifestation (CMS-HCC) E11.49   7/6/2016 - Present    Chronic pain G89.29   7/6/2016 - Present    Coronary artery disease involving coronary bypass graft of native heart without angina pectoris I25.810   7/15/2016 - Present    Dyslipidemia E78.5   4/26/2017 - Present      Health Maintenance        Date Due Completion Dates    RETINAL SCREENING 7/12/1971 ---    IMM ZOSTER VACCINE 7/12/2013 ---    A1C SCREENING 6/19/2017 12/19/2016, 9/19/2016, 5/23/2016, 3/7/2016, 12/29/2015, 8/31/2015, 4/30/2015, 2/23/2015, 12/12/2014, 9/2/2014, 3/22/2014, 9/10/2013, 6/10/2013, 11/2/2012, 8/27/2012, 6/25/2012, 5/23/2012    IMM INFLUENZA (1) 9/1/2017 10/27/2016, 10/20/2015, 12/15/2014, 10/17/2013, 11/7/2012    DIABETES MONOFILAMENT / LE EXAM 10/27/2017 10/27/2016    FASTING LIPID PROFILE 12/19/2017 12/19/2016, 4/26/2016, 4/30/2015, 2/23/2015, 3/22/2014, 9/10/2013, 6/10/2013, 12/18/2012, 11/2/2012, 8/27/2012, 5/23/2012    URINE ACR / MICROALBUMIN 12/19/2017 12/19/2016, 9/19/2016, 5/23/2016, 3/7/2016, 8/31/2015, 2/23/2015, 12/12/2014, 3/22/2014, 5/23/2012    SERUM CREATININE 12/19/2017 12/19/2016, 9/19/2016, 5/23/2016, 5/7/2016, 5/3/2016, 5/2/2016, 5/1/2016, 4/30/2016, 4/29/2016, 4/28/2016, 4/27/2016, 4/26/2016, 4/25/2016, 4/25/2016, 4/30/2015, 4/7/2015, 3/31/2015, 3/24/2015, 3/17/2015, 3/10/2015, 3/3/2015, 2/23/2015, 12/12/2014, 3/22/2014, 9/10/2013, 6/10/2013, 2/11/2013,  11/2/2012, 8/27/2012, 6/29/2012, 6/25/2012, 5/23/2012    IMM DTaP/Tdap/Td Vaccine (2 - Td) 9/10/2025 9/10/2015    COLONOSCOPY 4/20/2027 4/20/2017 (N/S)    Override on 4/20/2017: (N/S) (PER GIC AND C NO COLONOSCOPY)            Current Immunizations     Influenza TIV (IM) 10/17/2013, 11/7/2012    Influenza Vaccine Quad Inj (Preserved) 10/27/2016, 10/20/2015, 12/15/2014  1:14 PM    Pneumococcal polysaccharide vaccine (PPSV-23) 10/27/2016    Tdap Vaccine 9/10/2015      Below and/or attached are the medications your provider expects you to take. Review all of your home medications and newly ordered medications with your provider and/or pharmacist. Follow medication instructions as directed by your provider and/or pharmacist. Please keep your medication list with you and share with your provider. Update the information when medications are discontinued, doses are changed, or new medications (including over-the-counter products) are added; and carry medication information at all times in the event of emergency situations     Allergies:  SULFA DRUGS - (reactions not documented)               Medications  Valid as of: August 18, 2017 -  3:43 PM    Generic Name Brand Name Tablet Size Instructions for use    AmLODIPine Besylate (Tab) NORVASC 10 MG Take 1 Tab by mouth every day.        Aspirin (Tablet Delayed Response) aspirin 81 MG Take 1 Tab by mouth every day.        Atorvastatin Calcium (Tab) LIPITOR 40 MG Take 1 Tab by mouth every bedtime.        Cholecalciferol (Cap) Cholecalciferol 2000 UNIT Take 1 Cap by mouth every morning.        Clopidogrel Bisulfate (Tab) PLAVIX 75 MG Take 1 Tab by mouth every day.        Gabapentin (Cap) NEURONTIN 300 MG Take 1 Cap by mouth 3 times a day.        Glucose Blood (Strip) glucose blood  AS DIRECTED TEST BLOOD SUGAR 4 TIMES DAILY        Insulin Pen Needle (Misc) Insulin Pen Needle 30G X 5 MM Inject 1 Each as instructed every evening.        Insulin Syringe-Needle U-100 (Misc) INSULIN  "SYRINGE .5CC/29G 29G X 1/2\" 0.5 ML USE AS DIRECTED BY PHYSICIAN        Insulin Syringe-Needle U-100 (Misc) INSULIN SYRINGE .5CC/29G 29G X 1/2\" 0.5 ML To use twice a day with insulin        Lansoprazole (CAPSULE DELAYED RELEASE) PREVACID 15 MG Take 15 mg by mouth every day.        Lisinopril (Tab) PRINIVIL 10 MG Take 1 Tab by mouth every day.        MetFORMIN HCl (TABLET SR 24 HR) GLUCOPHAGE  MG Take 1 Tab by mouth every bedtime.        Misc. Devices (Misc) Misc. Devices  True metrix glucometer and teststrips to check blood sugars qid #120        .                 Medicines prescribed today were sent to:     [facility] #[id number] - [place] - [address]    [address] [place] [address]    Phone: [phone] Fax: [phone]    Open 24 Hours?: No      Medication refill instructions:       If your prescription bottle indicates you have medication refills left, it is not necessary to call your provider’s office. Please contact your pharmacy and they will refill your medication.    If your prescription bottle indicates you do not have any refills left, you may request refills at any time through one of the following ways: The online [employer] system (except Urgent Care), by calling your provider’s office, or by asking your pharmacy to contact your provider’s office with a refill request. Medication refills are processed only during regular business hours and may not be available until the next business day. Your provider may request additional information or to have a follow-up visit with you prior to refilling your medication.   *Please Note: Medication refills are assigned a new Rx number when refilled electronically. Your pharmacy may indicate that no refills were authorized even though a new prescription for the same medication is available at the pharmacy. Please request the medicine by name with the pharmacy before contacting your provider for a refill.        Your To Do List     Future Labs/Procedures " Complete By Expires    BASIC METABOLIC PANEL  As directed 8/18/2018         MyChart Status: Patient Declined

## 2017-08-18 NOTE — PROGRESS NOTES
Name:          Aravind Quinones   YOB: 1955  Date:     8/18/2017      Kitty Ang M.D.  [address]  [place] [address]     Andrew Kelley MD  [address]  [place] [address]  Phone: [phone]  Back Line: [phone]  Fax: [phone]  E-mail: [email or web address]   Dear Dr. Ang,    We had the pleasure of seeing your patient, Aravind Quinones, in Cardiology Clinic at [facility] and Vascular today.    As you know, he is a 62-year-old man with a history of 4-V CABG 4/25/16 (LIMA-LAD, SVG-diag, SVG-OM, SVG-PDA), type II diabetes, hypertension, and dyslipidemia. He was a long-time smoker before bypass and has since quit.    He is doing overall well today, and has no cardiovascular complaints. His blood pressure is 90/60 mmHg in our office today related to which I stopped his low-dose Coreg (3.125 mg by mouth twice a day). Otherwise, I continued his cardiovascular regimen including amlodipine, lisinopril, atorvastatin 40 mg by mouth daily at bedtime, and dual antiplatelet therapy with aspirin and Plavix. He again mentions chest discomfort that is atypical for angina for which I previously ordered myocardial perfusion imaging. He did not get that test done, and I encouraged him again to get it done. Until that is done, I will not take him off of dual antiplatelet therapy though again I do not suspect that he has dropped any of his bypass grafts.    We will have the patient follow-up in 6 months.    Thank you for the referral and please do not hesitate to contact me at any time. My contact information is listed above.    This note was dictated using Dragon speech recognition software.     A full note including my physical examination and a full list of rectified medications is available in our medical record, and can be faxed as well.    Andrew Kelley MD  Cardiologist  [facility]

## 2017-08-18 NOTE — Clinical Note
Name:          Aravind Quinones   YOB: 1955  Date:     8/18/2017      Kitty Agn M.D.  [address]  [place] [address]     Andrew Kelley MD  [address]  [place] [address]  Phone: [phone]  Back Line: [phone]  Fax: [phone]  E-mail: [email or web address]   Dear Dr. Ang,    We had the pleasure of seeing your patient, Aravind Quinones, in Cardiology Clinic at [facility] and Vascular today.    As you know, he is a 62-year-old man with a history of 4-V CABG 4/25/16 (LIMA-LAD, SVG-diag, SVG-OM, SVG-PDA), type II diabetes, hypertension, and dyslipidemia. He was a long-time smoker before bypass and has since quit.    He is doing overall well today, and has no cardiovascular complaints. His blood pressure is 90/60 mmHg in our office today related to which I stopped his low-dose Coreg (3.125 mg by mouth twice a day). Otherwise, I continued his cardiovascular regimen including amlodipine, lisinopril, atorvastatin 40 mg by mouth daily at bedtime, and dual antiplatelet therapy with aspirin and Plavix. He again mentions chest discomfort that is atypical for angina for which I previously ordered myocardial perfusion imaging. He did not get that test done, and I encouraged him again to get it done. Until that is done, I will not take him off of dual antiplatelet therapy though again I do not suspect that he has dropped any of his bypass grafts.    We will have the patient follow-up in 6 months.    Thank you for the referral and please do not hesitate to contact me at any time. My contact information is listed above.    This note was dictated using Dragon speech recognition software.     A full note including my physical examination and a full list of rectified medications is available in our medical record, and can be faxed as well.    Andrew Kelley MD  Cardiologist  [facility]

## 2017-09-06 ENCOUNTER — OFFICE VISIT (OUTPATIENT)
Dept: INTERNAL MEDICINE | Facility: MEDICAL CENTER | Age: 62
End: 2017-09-06
Payer: MEDICAID

## 2017-09-06 VITALS
BODY MASS INDEX: 26.86 KG/M2 | HEART RATE: 64 BPM | SYSTOLIC BLOOD PRESSURE: 98 MMHG | WEIGHT: 187.6 LBS | HEIGHT: 70 IN | DIASTOLIC BLOOD PRESSURE: 58 MMHG | TEMPERATURE: 97.1 F | OXYGEN SATURATION: 94 % | RESPIRATION RATE: 19 BRPM

## 2017-09-06 DIAGNOSIS — E11.8 DM (DIABETES MELLITUS) WITH COMPLICATIONS (HCC): ICD-10-CM

## 2017-09-06 DIAGNOSIS — E11.42 DIABETIC POLYNEUROPATHY ASSOCIATED WITH TYPE 2 DIABETES MELLITUS (HCC): ICD-10-CM

## 2017-09-06 DIAGNOSIS — E55.9 VITAMIN D DEFICIENCY DISEASE: ICD-10-CM

## 2017-09-06 DIAGNOSIS — I25.2 HISTORY OF ST ELEVATION MYOCARDIAL INFARCTION (STEMI): ICD-10-CM

## 2017-09-06 DIAGNOSIS — M25.572 CHRONIC PAIN OF LEFT ANKLE: ICD-10-CM

## 2017-09-06 DIAGNOSIS — G89.29 CHRONIC PAIN OF LEFT ANKLE: ICD-10-CM

## 2017-09-06 DIAGNOSIS — Z95.1 HX OF CABG: ICD-10-CM

## 2017-09-06 PROCEDURE — 99213 OFFICE O/P EST LOW 20 MIN: CPT | Mod: GE | Performed by: INTERNAL MEDICINE

## 2017-09-06 RX ORDER — GABAPENTIN 300 MG/1
300 CAPSULE ORAL 3 TIMES DAILY
Qty: 90 CAP | Refills: 6 | Status: SHIPPED | OUTPATIENT
Start: 2017-09-06 | End: 2018-04-17 | Stop reason: SDUPTHER

## 2017-09-06 RX ORDER — METFORMIN HYDROCHLORIDE 500 MG/1
500 TABLET, EXTENDED RELEASE ORAL
Qty: 30 TAB | Refills: 5 | Status: SHIPPED | OUTPATIENT
Start: 2017-09-06 | End: 2018-02-28 | Stop reason: SDUPTHER

## 2017-09-06 RX ORDER — ASPIRIN 81 MG/1
81 TABLET ORAL DAILY
Qty: 30 TAB | Refills: 11 | Status: SHIPPED | OUTPATIENT
Start: 2017-09-06

## 2017-09-06 ASSESSMENT — PAIN SCALES - GENERAL: PAINLEVEL: 6=MODERATE PAIN

## 2017-09-06 NOTE — PROGRESS NOTES
Established Patient    Aravind presents today with the following:    CC: Refill medications and referral for the left sided ankle pain    HPI: Aravind Quinones is a 62 y.o. male past history of Diabetes mellitus type 2 diagnosed 3 years ago, myocardial infarction s/p CABG, ankle surgery in 1995 followed by wound infection resolved, patient, since the surgery has, continues pain in the left ankle, mainly during walking, he reports intermittent swelling and severe pain with activity, previously referred to pain management clinic for two times, patient received a call from [facility] for appointment, patient already has a prescription for neuropathy, educated about using over the counter pain medication and there is no indication for narcotics, patient will follow with the pain clinic for further pain evaluation.       Review of Systems:     Constitutional: Denies fevers, Denies weight changes  Eyes: Denies changes in vision, no eye pain  Ears/Nose/Throat/Mouth: Denies nasal congestion or sore throat   Cardiovascular: Denies chest pain or palpitations   Respiratory: Denies shortness of breath , Denies cough  Gastrointestinal/Hepatic: Denies abdominal pain, nausea, vomiting, diarrhea or constipation.  Genitourinary: Denies bladder dysfunction, dysuria or frequency  Musculoskeletal/Rheum: per H and P  Skin: Denies rash.  Neurological: Denies headache, confusion, memory loss or focal weakness  Psychiatric: denies mood disorder         Patient Active Problem List    Diagnosis Date Noted   • Atrial fibrillation (CMS-HCC) 05/04/2016     Priority: High     Class: Acute   • 4-V CABG 4/25/16 (LIMA-LAD, SVG-diag, SVG-OM, SVG-PDA) 05/02/2016     Priority: High   • STEMI (ST elevation myocardial infarction) (CMS-HCC) 04/25/2016     Priority: High   • Anemia 05/04/2016     Priority: Medium     Class: Acute   • Leukocytosis 05/04/2016     Priority: Medium     Class: Acute   • Peripheral neuropathy 05/04/2016     Priority:  "Medium     Class: Acute   • Essential hypertension 05/29/2012     Priority: Medium   • DM (diabetes mellitus) (CMS-HCC) 05/22/2012     Priority: Medium   • Achilles tendon infection 02/10/2015     Priority: Low   • Dyslipidemia, goal LDL below 70 05/29/2012     Priority: Low   • Vitamin D insufficiency 05/29/2012     Priority: Low   • Dyslipidemia 04/26/2017   • Coronary artery disease of bypass graft of native heart with stable angina pectoris (CMS-HCC) 07/15/2016   • DM (diabetes mellitus) type II controlled, neurological manifestation (CMS-HCC) 07/06/2016   • Chronic pain 07/06/2016   • Cardiomyopathy (CMS-HCC) 05/31/2016   • Open wound of knee, leg (except thigh), and ankle, with tendon involvement 02/25/2015   • DDD (degenerative disc disease) 02/14/2013   • Arthritis of ankle joint 02/14/2013   • Open wound 02/14/2013   • Family history of diabetes mellitus 05/22/2012       Current Outpatient Prescriptions   Medication Sig Dispense Refill   • insulin glargine (BASAGLAR KWIKPEN) 100 UNIT/ML Solution Pen-injector injection Inject  as instructed every evening.     • gabapentin (NEURONTIN) 300 MG Cap Take 1 Cap by mouth 3 times a day. 90 Cap 6   • metformin ER (GLUCOPHAGE XR) 500 MG TABLET SR 24 HR Take 1 Tab by mouth every bedtime. 30 Tab 5   • glucose blood (TRUETEST TEST) strip AS DIRECTED TEST BLOOD SUGAR 4 TIMES DAILY 100 Strip 11   • Cholecalciferol 2000 UNIT Cap Take 1 Cap by mouth every morning. 30 Cap 6   • aspirin 81 MG EC tablet Take 1 Tab by mouth every day. 30 Tab 11   • lansoprazole (PREVACID) 15 MG CAPSULE DELAYED RELEASE Take 15 mg by mouth every day.     • Insulin Pen Needle 30G X 5 MM Misc Inject 1 Each as instructed every evening. 30 Each 4   • INSULIN SYRINGE .5CC/29G (ULTICARE INSULIN SYRINGE) 29G X 1/2\" 0.5 ML Misc To use twice a day with insulin 60 Each 2   • atorvastatin (LIPITOR) 40 MG Tab Take 1 Tab by mouth every bedtime. 90 Tab 3   • amlodipine (NORVASC) 10 MG Tab Take 1 Tab by mouth " "every day. 90 Tab 3   • lisinopril (PRINIVIL) 10 MG Tab Take 1 Tab by mouth every day. 90 Tab 3   • clopidogrel (PLAVIX) 75 MG Tab Take 1 Tab by mouth every day. 90 Tab 3   • INSULIN SYRINGE .5CC/29G (ULTICARE INSULIN SYRINGE) 29G X 1/2\" 0.5 ML Misc USE AS DIRECTED BY PHYSICIAN 60 Each 0   • Misc. Devices Misc True metrix glucometer and teststrips to check blood sugars qid #120 120 Device 6     No current facility-administered medications for this visit.        BP (!) 98/58   Pulse 64   Temp 36.2 °C (97.1 °F)   Resp 19   Ht 1.778 m (5' 10\")   Wt 85.1 kg (187 lb 9.6 oz)   SpO2 94%   BMI 26.92 kg/m²     Physical Exam   Constitutional:  Comfortable. No acute distress.   Eyes: Pupils are equal, round, and reactive to light. No scleral icterus.  Neck: Neck supple. No thyromegaly present.   Cardiovascular: scar from CABG Regular rate and rhythm. No murmurs, rubs or gallops.  Abdomen:  Soft, non tender, non distended. Bowel sounds positive.  Extremities: No clubbing, cyanosis, edema. Scar on the left ankle from the tendon surgery  Skin: No Rash. No cyanosis. Nails show no clubbing.      Assessment and Plan    1. Diabetic polyneuropathy associated with type 2 diabetes mellitus (CMS-HCC)  - patient has a pain mainly at the ankle partial improving with gabapentin,   - No Narcotic given during this visit  - gabapentin (NEURONTIN) 300 MG Cap; Take 1 Cap by mouth 3 times a day.  Dispense: 90 Cap; Refill: 6    2. DM (diabetes mellitus) with complications (CMS-[facility])  -stable  Refills  - metformin ER (GLUCOPHAGE XR) 500 MG TABLET SR 24 HR; Take 1 Tab by mouth every bedtime.  Dispense: 30 Tab; Refill: 5  - glucose blood (TRUETEST TEST) strip; AS DIRECTED TEST BLOOD SUGAR 4 TIMES DAILY  Dispense: 100 Strip; Refill: 11    3. Vitamin D deficiency disease  - Refill Cholecalciferol 2000 UNIT Cap; Take 1 Cap by mouth every morning.  Dispense: 30 Cap; Refill: 6    4. Hx of CABG  - denies any chest pain, shortness of breath, following " cardiology, off carvedilol for now because of low blood pressure  - will schedule by cardiology for a stress test   - aspirin 81 MG EC tablet; Take 1 Tab by mouth every day.  Dispense: 30 Tab; Refill: 11    5. History of ST elevation myocardial infarction (STEMI)  - on dual antiplatelet therapy, following cardiology Dr Kelley  - Refill aspirin 81 MG EC tablet; Take 1 Tab by mouth every day.  Dispense: 30 Tab; Refill: 11    6. Chronic pain of left ankle  - REFERRAL TO PAIN CLINIC at [facility] for pain management  - opiate seeking behavior/ No narcotics given during the visit  - no sign of infection on ankle exam. No redness, no abscess, no swelling,    - denies any fever or chills   - educated about using tylenol for pain control   Not exceed 3000 mg/day         Signed by: Kitty Ang M.D.

## 2017-11-08 NOTE — TELEPHONE ENCOUNTER
Last seen: 09/06/17 by Dr. Ang  Next appt: 12/20/17 with Dr. Ang    Was the patient seen in the last year in this department? Yes   Does patient have an active prescription for medications requested? No   Received Request Via: Pharmacy

## 2017-12-11 NOTE — TELEPHONE ENCOUNTER
Was the patient seen in the last year in this department? Yes  Pt last seen on: 06/07/2017 by Dr. Ang Next appt on: 12/20/2017      Does patient have an active prescription for medications requested? No     Received Request Via: Pharmacy

## 2017-12-18 RX ORDER — INSULIN GLARGINE 100 [IU]/ML
50 INJECTION, SOLUTION SUBCUTANEOUS EVERY EVENING
Qty: 30 ML | Refills: 2 | Status: SHIPPED | OUTPATIENT
Start: 2017-12-18 | End: 2018-06-29 | Stop reason: SDUPTHER

## 2017-12-20 ENCOUNTER — APPOINTMENT (OUTPATIENT)
Dept: INTERNAL MEDICINE | Facility: MEDICAL CENTER | Age: 62
End: 2017-12-20
Payer: MEDICAID

## 2018-01-11 DIAGNOSIS — E78.5 DYSLIPIDEMIA: ICD-10-CM

## 2018-01-11 DIAGNOSIS — I10 ESSENTIAL HYPERTENSION: ICD-10-CM

## 2018-01-11 DIAGNOSIS — I21.3 ST ELEVATION MYOCARDIAL INFARCTION (STEMI), UNSPECIFIED ARTERY (HCC): ICD-10-CM

## 2018-01-11 DIAGNOSIS — Z95.1 S/P CABG X 4: ICD-10-CM

## 2018-01-12 ENCOUNTER — HOSPITAL ENCOUNTER (OUTPATIENT)
Dept: LAB | Facility: MEDICAL CENTER | Age: 63
End: 2018-01-12
Attending: INTERNAL MEDICINE
Payer: MEDICAID

## 2018-01-12 DIAGNOSIS — Z95.1 S/P CABG X 4: ICD-10-CM

## 2018-01-12 LAB
ANION GAP SERPL CALC-SCNC: 9 MMOL/L (ref 0–11.9)
BASOPHILS # BLD AUTO: 1.3 % (ref 0–1.8)
BASOPHILS # BLD: 0.11 K/UL (ref 0–0.12)
BUN SERPL-MCNC: 10 MG/DL (ref 8–22)
CALCIUM SERPL-MCNC: 9.3 MG/DL (ref 8.5–10.5)
CHLORIDE SERPL-SCNC: 102 MMOL/L (ref 96–112)
CHOLEST SERPL-MCNC: 87 MG/DL (ref 100–199)
CO2 SERPL-SCNC: 26 MMOL/L (ref 20–33)
CREAT SERPL-MCNC: 0.7 MG/DL (ref 0.5–1.4)
EOSINOPHIL # BLD AUTO: 0.24 K/UL (ref 0–0.51)
EOSINOPHIL NFR BLD: 2.8 % (ref 0–6.9)
ERYTHROCYTE [DISTWIDTH] IN BLOOD BY AUTOMATED COUNT: 41.1 FL (ref 35.9–50)
GLUCOSE SERPL-MCNC: 81 MG/DL (ref 65–99)
HCT VFR BLD AUTO: 44.5 % (ref 42–52)
HDLC SERPL-MCNC: 41 MG/DL
HGB BLD-MCNC: 14.9 G/DL (ref 14–18)
IMM GRANULOCYTES # BLD AUTO: 0.03 K/UL (ref 0–0.11)
IMM GRANULOCYTES NFR BLD AUTO: 0.3 % (ref 0–0.9)
LDLC SERPL CALC-MCNC: 36 MG/DL
LYMPHOCYTES # BLD AUTO: 3.49 K/UL (ref 1–4.8)
LYMPHOCYTES NFR BLD: 40.1 % (ref 22–41)
MCH RBC QN AUTO: 29.6 PG (ref 27–33)
MCHC RBC AUTO-ENTMCNC: 33.5 G/DL (ref 33.7–35.3)
MCV RBC AUTO: 88.3 FL (ref 81.4–97.8)
MONOCYTES # BLD AUTO: 0.72 K/UL (ref 0–0.85)
MONOCYTES NFR BLD AUTO: 8.3 % (ref 0–13.4)
NEUTROPHILS # BLD AUTO: 4.11 K/UL (ref 1.82–7.42)
NEUTROPHILS NFR BLD: 47.2 % (ref 44–72)
NRBC # BLD AUTO: 0 K/UL
NRBC BLD-RTO: 0 /100 WBC
PLATELET # BLD AUTO: 267 K/UL (ref 164–446)
PMV BLD AUTO: 11.1 FL (ref 9–12.9)
POTASSIUM SERPL-SCNC: 4.3 MMOL/L (ref 3.6–5.5)
RBC # BLD AUTO: 5.04 M/UL (ref 4.7–6.1)
SODIUM SERPL-SCNC: 137 MMOL/L (ref 135–145)
TRIGL SERPL-MCNC: 49 MG/DL (ref 0–149)
WBC # BLD AUTO: 8.7 K/UL (ref 4.8–10.8)

## 2018-01-12 PROCEDURE — 80061 LIPID PANEL: CPT

## 2018-01-12 PROCEDURE — 36415 COLL VENOUS BLD VENIPUNCTURE: CPT

## 2018-01-12 PROCEDURE — 85025 COMPLETE CBC W/AUTO DIFF WBC: CPT

## 2018-01-12 PROCEDURE — 80048 BASIC METABOLIC PNL TOTAL CA: CPT

## 2018-01-15 ENCOUNTER — HOSPITAL ENCOUNTER (OUTPATIENT)
Dept: RADIOLOGY | Facility: MEDICAL CENTER | Age: 63
End: 2018-01-15
Attending: NURSE PRACTITIONER
Payer: MEDICAID

## 2018-01-15 DIAGNOSIS — M65.072: ICD-10-CM

## 2018-01-15 DIAGNOSIS — M25.572 LEFT ANKLE PAIN, UNSPECIFIED CHRONICITY: ICD-10-CM

## 2018-01-15 PROCEDURE — A9579 GAD-BASE MR CONTRAST NOS,1ML: HCPCS | Performed by: NURSE PRACTITIONER

## 2018-01-15 PROCEDURE — 700117 HCHG RX CONTRAST REV CODE 255: Performed by: NURSE PRACTITIONER

## 2018-01-15 PROCEDURE — 73723 MRI JOINT LWR EXTR W/O&W/DYE: CPT | Mod: LT

## 2018-01-15 RX ADMIN — GADODIAMIDE 20 ML: 287 INJECTION INTRAVENOUS at 15:41

## 2018-01-17 RX ORDER — CLOPIDOGREL BISULFATE 75 MG/1
TABLET ORAL
Qty: 90 TAB | Refills: 2 | Status: SHIPPED | OUTPATIENT
Start: 2018-01-17 | End: 2018-01-30

## 2018-01-17 RX ORDER — AMLODIPINE BESYLATE 10 MG/1
10 TABLET ORAL DAILY
Qty: 90 TAB | Refills: 2 | Status: SHIPPED | OUTPATIENT
Start: 2018-01-17 | End: 2018-01-30

## 2018-01-17 RX ORDER — CARVEDILOL 3.12 MG/1
TABLET ORAL
Qty: 180 TAB | Refills: 2 | Status: SHIPPED | OUTPATIENT
Start: 2018-01-17 | End: 2018-01-30

## 2018-01-17 RX ORDER — LISINOPRIL 10 MG/1
10 TABLET ORAL DAILY
Qty: 90 TAB | Refills: 2 | Status: SHIPPED | OUTPATIENT
Start: 2018-01-17

## 2018-01-17 RX ORDER — ATORVASTATIN CALCIUM 40 MG/1
40 TABLET, FILM COATED ORAL
Qty: 90 TAB | Refills: 2 | Status: SHIPPED | OUTPATIENT
Start: 2018-01-17

## 2018-01-18 ENCOUNTER — TELEPHONE (OUTPATIENT)
Dept: CARDIOLOGY | Facility: MEDICAL CENTER | Age: 63
End: 2018-01-18

## 2018-01-18 NOTE — TELEPHONE ENCOUNTER
----- Message from Andrew Kelley M.D. sent at 1/18/2018  8:13 AM PST -----  Excellent control of cholesterol. Normal blood counts.    IBA MD    ===========================================================    S/w pt, discussed lab results per Dr Kelley, pt verbalizes understanding

## 2018-01-30 ENCOUNTER — OFFICE VISIT (OUTPATIENT)
Dept: CARDIOLOGY | Facility: MEDICAL CENTER | Age: 63
End: 2018-01-30
Payer: MEDICAID

## 2018-01-30 VITALS
DIASTOLIC BLOOD PRESSURE: 70 MMHG | HEART RATE: 80 BPM | SYSTOLIC BLOOD PRESSURE: 110 MMHG | BODY MASS INDEX: 27.2 KG/M2 | OXYGEN SATURATION: 94 % | WEIGHT: 190 LBS | HEIGHT: 70 IN

## 2018-01-30 DIAGNOSIS — I25.708 CORONARY ARTERY DISEASE OF BYPASS GRAFT OF NATIVE HEART WITH STABLE ANGINA PECTORIS (HCC): Primary | ICD-10-CM

## 2018-01-30 DIAGNOSIS — K21.9 GASTROESOPHAGEAL REFLUX DISEASE, ESOPHAGITIS PRESENCE NOT SPECIFIED: ICD-10-CM

## 2018-01-30 DIAGNOSIS — Z95.1 S/P CABG X 4: ICD-10-CM

## 2018-01-30 DIAGNOSIS — I50.22 CHF (CONGESTIVE HEART FAILURE), NYHA CLASS II, CHRONIC, SYSTOLIC (HCC): ICD-10-CM

## 2018-01-30 DIAGNOSIS — E78.5 DYSLIPIDEMIA, GOAL LDL BELOW 70: ICD-10-CM

## 2018-01-30 DIAGNOSIS — I10 ESSENTIAL HYPERTENSION: ICD-10-CM

## 2018-01-30 PROCEDURE — 99214 OFFICE O/P EST MOD 30 MIN: CPT | Performed by: INTERNAL MEDICINE

## 2018-01-30 RX ORDER — MECOBALAMIN 5000 MCG
15 TABLET,DISINTEGRATING ORAL DAILY
Qty: 90 CAP | Refills: 0 | Status: SHIPPED | OUTPATIENT
Start: 2018-01-30 | End: 2018-02-28 | Stop reason: SDUPTHER

## 2018-01-30 RX ORDER — AMLODIPINE BESYLATE 5 MG/1
5 TABLET ORAL DAILY
Qty: 90 TAB | Refills: 3 | Status: SHIPPED | OUTPATIENT
Start: 2018-01-30

## 2018-01-30 ASSESSMENT — ENCOUNTER SYMPTOMS
BLURRED VISION: 1
NERVOUS/ANXIOUS: 1
BRUISES/BLEEDS EASILY: 1
SENSORY CHANGE: 1
INSOMNIA: 1
TINGLING: 1
BACK PAIN: 1

## 2018-01-30 NOTE — PATIENT INSTRUCTIONS
Stop carvedilol    Stop Plavix (clopidogrel)    Cut amlodipine to 5 mg by mouth daily (cut in 1/2 until you run out of medicine, then get the 5 mg strength from the pharmacy)

## 2018-01-30 NOTE — LETTER
Name:          Aravind Quinones   YOB: 1955  Date:     1/30/2018      Kitty Ang M.D.  [address]  [place] [address]     Andrew Kelley MD  [address], [address]  [place] [address]  Phone: [phone]  Back Line: [phone]  Fax: [phone]  E-mail: [email or web address]   Dear Dr. Ang,    We had the pleasure of seeing your patient, Aravind Quinones, in Cardiology Clinic at [facility] today.    As you know, he is a 62-year-old man with a history of 4-V CABG 4/25/16 (LIMA-LAD, SVG-diag, SVG-OM, SVG-PDA), type II diabetes, hypertension, and dyslipidemia. He was a long-time smoker before bypass and has since quit.    He has no cardiovascular complaints today with the exception of chest pain that is atypical for angina. At our last 2 appointments I had asked him to obtain myocardial perfusion imaging both to reevaluate his left ventricular ejection fraction and also to ensure that he has normal coronary blood flow after his previous bypass surgery. He is not obtained that test, but again tells me that he for sure will. His blood pressure is aggressively managed and in the past has been in the 90s mmHg systolic, today measured at 110 mmHg systolic. We had previously stopped carvedilol, and today I have cut his amlodipine from 10 down to 5 mg by mouth daily. Certainly, if he has persistent systolic dysfunction I would change amlodipine altogether to spironolactone. We had previously stopped his carvedilol related to side effects. His lipids on the other hand are quite well-controlled with atorvastatin at 40 mg by mouth daily at bedtime, and I continued him to continue aspirin. Now more than a year and a half out from his initial event I have stopped his Plavix.    He does tell me that he had some difficulty filling his proton pump inhibitor, and continues to have quite a bit of reflux related which I refilled his Prevacid.    Return in about 6 months (around  7/30/2018).    Thank you for the referral and please do not hesitate to contact me at any time. My contact information is listed above.    This note was dictated using Dragon speech recognition software.     A full note including my physical examination and a full list of rectified medications is available in our medical record, and can be faxed as well.    Andrew Kelley MD  Cardiologist  [facility]

## 2018-01-31 NOTE — PROGRESS NOTES
Subjective:   Aravind Quinones is a 62 -year-old man with a history of 4-V CABG 4/25/16 (LIMA-LAD, SVG-diag, SVG-OM, SVG-PDA), type II diabetes, hypertension, and dyslipidemia. He was a long-time smoker before bypass and has since quit.    He continues to do quite well though he does still have a sharp, nonexertional, mild, left-sided chest discomfort that does not radiate and is inconsistent with previous chest discomfort prior to revascularization (atypical for anginal) sees. He has no other cardiovascular complaints. He is not checking his blood pressure at home, but tells me that it always looks good at office visits. He does not feel that he can afford a blood pressure cuff at a cost of $40-$50.    Past Medical History:   Diagnosis Date   • 4-V CABG 4/25/16 (LIMA-LAD, SVG-diag, SVG-OM, SVG-PDA) 5/2/2016   • Arthritis     L ankle-Osteo   • High cholesterol     contolled with meds   • Hypertension     controlled with meds   • Pain 02/24/15    L ankle-chronic=7/10   • STEMI (ST elevation myocardial infarction) (CMS-[facility]) 4/25/2016   • Tobacco abuse 5/29/2012   • Type II or unspecified type diabetes mellitus without mention of complication, not stated as uncontrolled     IDDM     Past Surgical History:   Procedure Laterality Date   • ACHILLES TENDON REPAIR  1995   • FLEXOR TENDON REPAIR  2/25/2015    Performed by Hiram Enciso M.D. at SURGERY [facility]   • IRRIGATION & DEBRIDEMENT ORTHO  2/25/2015    Performed by Hiram Enciso M.D. at SURGERY [facility]   • MULTIPLE CORONARY ARTERY BYPASS ENDO VEIN HARVEST  4/25/2016    Procedure: MULTIPLE CORONARY ARTERY BYPASS ENDO VEIN HARVEST- X4;  Surgeon: Medardo Goodson M.D.;  Location: SURGERY [facility];  Service:      Family History   Problem Relation Age of Onset   • Diabetes Mother    • Heart Disease Father    • Stroke Father      History   Smoking Status   • Former Smoker   • Packs/day: 0.25   • Years: 12.00   • Types: Cigarettes   • Quit  "date: 4/3/2016   Smokeless Tobacco   • Never Used     Allergies   Allergen Reactions   • Sulfa Drugs      ?; pt unsure of reaction     Outpatient Encounter Prescriptions as of 1/30/2018   Medication Sig Dispense Refill   • lansoprazole (PREVACID) 15 MG CAPSULE DELAYED RELEASE Take 1 Cap by mouth every day. 90 Cap 0   • amlodipine (NORVASC) 5 MG Tab Take 1 Tab by mouth every day. 90 Tab 3   • lisinopril (PRINIVIL) 10 MG Tab TAKE 1 TAB BY MOUTH EVERY DAY. 90 Tab 2   • atorvastatin (LIPITOR) 40 MG Tab TAKE 1 TAB BY MOUTH EVERY BEDTIME. 90 Tab 2   • BASAGLAR KWIKPEN 100 UNIT/ML Solution Pen-injector injection INJECT 50 UNITS AS INSTRUCTED EVERY EVENING. 30 mL 2   • gabapentin (NEURONTIN) 300 MG Cap Take 1 Cap by mouth 3 times a day. 90 Cap 6   • metformin ER (GLUCOPHAGE XR) 500 MG TABLET SR 24 HR Take 1 Tab by mouth every bedtime. 30 Tab 5   • Cholecalciferol 2000 UNIT Cap Take 1 Cap by mouth every morning. 30 Cap 6   • aspirin 81 MG EC tablet Take 1 Tab by mouth every day. 30 Tab 11   • [DISCONTINUED] carvedilol (COREG) 3.125 MG Tab TAKE 1 TAB BY MOUTH 2 TIMES A DAY, WITH MEALS. (Patient not taking: Reported on 1/30/2018) 180 Tab 2   • [DISCONTINUED] amlodipine (NORVASC) 10 MG Tab TAKE 1 TAB BY MOUTH EVERY DAY. 90 Tab 2   • [DISCONTINUED] clopidogrel (PLAVIX) 75 MG Tab TAKE 1 TABLET BY MOUTH EVERY DAY (Patient not taking: Reported on 1/30/2018) 90 Tab 2   • Insulin Pen Needle 30G X 5 MM Misc Inject 1 Each as instructed every evening. 100 Each 0   • glucose blood (TRUETEST TEST) strip AS DIRECTED TEST BLOOD SUGAR 4 TIMES DAILY 100 Strip 11   • [DISCONTINUED] lansoprazole (PREVACID) 15 MG CAPSULE DELAYED RELEASE Take 15 mg by mouth every day.     • INSULIN SYRINGE .5CC/29G (ULTICARE INSULIN SYRINGE) 29G X 1/2\" 0.5 ML Misc To use twice a day with insulin 60 Each 2   • INSULIN SYRINGE .5CC/29G (ULTICARE INSULIN SYRINGE) 29G X 1/2\" 0.5 ML Misc USE AS DIRECTED BY PHYSICIAN 60 Each 0   • Misc. Devices Misc True metrix " "glucometer and teststrips to check blood sugars qid #120 120 Device 6     No facility-administered encounter medications on file as of 1/30/2018.      Review of Systems   Eyes: Positive for blurred vision.   Cardiovascular: Positive for chest pain.   Musculoskeletal: Positive for back pain (radiates down is left leg).   Neurological: Positive for tingling and sensory change (in his left leg with weakness).   Endo/Heme/Allergies: Bruises/bleeds easily.   Psychiatric/Behavioral: The patient is nervous/anxious and has insomnia.    All other systems reviewed and are negative.       Objective:   /70   Pulse 80   Ht 1.778 m (5' 10\")   Wt 86.2 kg (190 lb)   SpO2 94%   BMI 27.26 kg/m²     Physical Exam   Constitutional: He is oriented to person, place, and time. He appears well-developed and well-nourished. No distress.   Somewhat gruff, elderly-appearing man though pleasant in no distress   HENT:   Head: Normocephalic and atraumatic.   Eyes: Conjunctivae and EOM are normal. Pupils are equal, round, and reactive to light. No scleral icterus.   Neck: Neck supple. No JVD present. No tracheal deviation present.   Cardiovascular: Normal rate, regular rhythm, normal heart sounds and intact distal pulses.  Exam reveals no gallop and no friction rub.    No murmur heard.  Pulses:       Dorsalis pedis pulses are 2+ on the right side, and 1+ on the left side.   No carotid bruits, healed midline sternotomy scar noted   Pulmonary/Chest: Effort normal. No stridor. No respiratory distress. He has no wheezes. He has no rales.   Abdominal: Soft. Bowel sounds are normal. He exhibits no distension.   Musculoskeletal: He exhibits no edema.   4-5 strength with plantar flexion of his left foot, 5 out of 5 strength with plantar flexion of his right foot. Mildly decreased sensation on his left lateral leg.   Neurological: He is alert and oriented to person, place, and time.   Full exam deferred   Skin: Skin is warm and dry. He is not " "diaphoretic. No erythema. No pallor.   Venous stasis changes left greater than right   Psychiatric: He has a normal mood and affect. Judgment and thought content normal.   Vitals reviewed.    Lab Results   Component Value Date/Time    WBC 8.7 01/12/2018 12:54 PM    RBC 5.04 01/12/2018 12:54 PM    HEMOGLOBIN 14.9 01/12/2018 12:54 PM    HEMATOCRIT 44.5 01/12/2018 12:54 PM    MCV 88.3 01/12/2018 12:54 PM    MCH 29.6 01/12/2018 12:54 PM    MCHC 33.5 (L) 01/12/2018 12:54 PM    MPV 11.1 01/12/2018 12:54 PM        Lab Results   Component Value Date/Time    SODIUM 137 01/12/2018 12:54 PM    POTASSIUM 4.3 01/12/2018 12:54 PM    CHLORIDE 102 01/12/2018 12:54 PM    CO2 26 01/12/2018 12:54 PM    GLUCOSE 81 01/12/2018 12:54 PM    BUN 10 01/12/2018 12:54 PM    CREATININE 0.70 01/12/2018 12:54 PM        Lab Results   Component Value Date/Time    ASTSGOT 13 12/19/2016 11:47 AM    ALTSGPT 15 12/19/2016 11:47 AM        Lab Results   Component Value Date/Time    CHOLSTRLTOT 87 (L) 01/12/2018 12:54 PM    LDL 36 01/12/2018 12:54 PM    HDL 41 01/12/2018 12:54 PM    TRIGLYCERIDE 49 01/12/2018 12:54 PM       Echocardiogram, 4/26/2016:  \"CONCLUSIONS  Normal left ventricular chamber size. Moderate concentric left ventricular hypertrophy. Severely reduced left ventricular systolic function. Left ventricular ejection fraction is visually estimated to be 20%. Left ventricular ejection fraction is visually estimated to be 20%. Apical akinesis. 3 ML of contrast was administered.  No significant valve abnormalities.   No prior study is available for comparison.\"    Ankle-brachial indices, 1/24/2017:  \" Findings   Bilateral.    Doppler waveforms at the ankle are brisk and biphasic.    Ankle-brachial index is normal.    Active pedal plantar flexion (toe-up) exercise was performed for 50    repetitions(goal 50).    Post exercise ankle/brachial index:              Right:       0.96        Left:.      1.06   Additional testing was not performed in " "accordance with lower extremity    arterial evaluation protocol\"      Assessment:     1. 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 4-V CABG 4/25/16 (LIMA-LAD, SVG-diag, SVG-OM, SVG-PDA)     3. Essential hypertension  amlodipine (NORVASC) 5 MG Tab   4. Dyslipidemia, goal LDL below 70     5. Gastroesophageal reflux disease, esophagitis presence not specified  lansoprazole (PREVACID) 15 MG CAPSULE DELAYED RELEASE   6. CHF (congestive heart failure), NYHA class II, chronic, systolic (CMS-HCC)         Medical Decision Making:  Today's Assessment / Status / Plan:     He has no cardiovascular complaints today with the exception of chest pain that is atypical for angina. At our last 2 appointments I had asked him to obtain myocardial perfusion imaging both to reevaluate his left ventricular ejection fraction and also to ensure that he has normal coronary blood flow after his previous bypass surgery. He is not obtained that test, but again tells me that he for sure will. His blood pressure is aggressively managed and in the past has been in the 90s mmHg systolic, today measured at 110 mmHg systolic. We had previously stopped carvedilol, and today I have cut his amlodipine from 10 down to 5 mg by mouth daily. Certainly, if he has persistent systolic dysfunction I would change amlodipine altogether to spironolactone. We had previously stopped his carvedilol related to side effects. His lipids on the other hand are quite well-controlled with atorvastatin at 40 mg by mouth daily at bedtime, and I continued him to continue aspirin. Now more than a year and a half out from his initial event I have stopped his Plavix.    He does tell me that he had some difficulty filling his proton pump inhibitor, and continues to have quite a bit of reflux related which I refilled his Prevacid.    Andrew Kelley MD  Cardiologist, [facility]     Return in about 6 " months (around 7/30/2018).

## 2018-02-01 ENCOUNTER — TELEPHONE (OUTPATIENT)
Dept: CARDIOLOGY | Facility: MEDICAL CENTER | Age: 63
End: 2018-02-01

## 2018-02-01 NOTE — TELEPHONE ENCOUNTER
NO PAR needed on lansoprazole according to the plan:  Aravind Quinones Macdonald: DMFLYP  Outcome  Additional Information Required  Available without authorization.  DrugLansoprazole 15MG dr capsules  FormAn[place] Medicaid Electronic Prior Authorization Form

## 2018-02-28 ENCOUNTER — OFFICE VISIT (OUTPATIENT)
Dept: INTERNAL MEDICINE | Facility: MEDICAL CENTER | Age: 63
End: 2018-02-28
Payer: MEDICAID

## 2018-02-28 VITALS
OXYGEN SATURATION: 92 % | HEIGHT: 70 IN | DIASTOLIC BLOOD PRESSURE: 76 MMHG | SYSTOLIC BLOOD PRESSURE: 120 MMHG | WEIGHT: 193.6 LBS | TEMPERATURE: 98.5 F | RESPIRATION RATE: 19 BRPM | HEART RATE: 76 BPM | BODY MASS INDEX: 27.72 KG/M2

## 2018-02-28 DIAGNOSIS — K21.9 GASTROESOPHAGEAL REFLUX DISEASE, ESOPHAGITIS PRESENCE NOT SPECIFIED: ICD-10-CM

## 2018-02-28 DIAGNOSIS — E11.8 DM (DIABETES MELLITUS) WITH COMPLICATIONS (HCC): ICD-10-CM

## 2018-02-28 LAB
HBA1C MFR BLD: 9.2 % (ref ?–5.8)
INT CON NEG: NEGATIVE
INT CON POS: POSITIVE

## 2018-02-28 PROCEDURE — 99214 OFFICE O/P EST MOD 30 MIN: CPT | Mod: GC | Performed by: INTERNAL MEDICINE

## 2018-02-28 PROCEDURE — 83036 HEMOGLOBIN GLYCOSYLATED A1C: CPT | Performed by: INTERNAL MEDICINE

## 2018-02-28 RX ORDER — METFORMIN HYDROCHLORIDE 500 MG/1
1000 TABLET, EXTENDED RELEASE ORAL
Qty: 90 TAB | Refills: 3 | Status: SHIPPED | OUTPATIENT
Start: 2018-02-28 | End: 2018-03-18

## 2018-02-28 RX ORDER — MECOBALAMIN 5000 MCG
15 TABLET,DISINTEGRATING ORAL DAILY
Qty: 90 CAP | Refills: 0 | Status: SHIPPED | OUTPATIENT
Start: 2018-02-28

## 2018-02-28 ASSESSMENT — PAIN SCALES - GENERAL: PAINLEVEL: 7=MODERATE-SEVERE PAIN

## 2018-02-28 ASSESSMENT — ACTIVITIES OF DAILY LIVING (ADL): BATHING_REQUIRES_ASSISTANCE: 0

## 2018-02-28 ASSESSMENT — PATIENT HEALTH QUESTIONNAIRE - PHQ9: CLINICAL INTERPRETATION OF PHQ2 SCORE: 0

## 2018-02-28 NOTE — PROGRESS NOTES
Established Patient    Aravind presents today with the following:    CC:  Diabetes management, pain in his left leg, medications refill, unable to see ophthalmology     HPI: Aravind Quinones is a 62 y.o. male Diabetes mellitus type 2 diagnosed 4-5 years ago, myocardial infarction s/p CABG, ankle surgery in 1995 followed by pain in the left side leg from multiple surgeries and for some reason worse after the cardiac surgery.    Diabetes mellitus  Patient report blood sugar below 200 most of the time of home check, no low results and no high results, report checking three times daily, he was unable to check with ophthalmology due to lack of insurance acceptance in the nearby ophthalmologist, unable to get transportation to far from where he lives, today hemoglobin A1c 9.2 during the office visit, currently on insulin 50 UNITS and metformin 500 extended release which increased to 1000 mg, patient on atorvastatin 40 mg, lisinopril 10 mg, numbness on both feet mainly on the left site on gabapentin 300 mg 3 times a day. Attempt of retinal eye exam was unsuccessful.    Hypertension  Patient off carvedilol currently using amlodipine in reduced does 5 mg, lisinopril 10 mg. Blood pressure stable, 120 systolic and patient feel better. Denies any headache, confusion or vomiting.      Review of Systems:     Constitutional: Denies fevers, Denies weight changes  Eyes: Denies changes in vision, no eye pain  Ears/Nose/Throat/Mouth: Denies nasal congestion or sore throat   Cardiovascular: Denies chest pain or palpitations, orthopnea    Respiratory: Denies shortness of breath or chest pain  Gastrointestinal/Hepatic: Denies abdominal pain, nausea, vomiting, diarrhea or constipation.  Genitourinary: Denies bladder dysfunction, dysuria or frequency  Musculoskeletal/Rheum: per H and P   Skin: Denies rash.  Neurological: Denies headache, confusion, memory loss or focal weakness   Psychiatric: denies mood disorder         Patient Active  Problem List    Diagnosis Date Noted   • Atrial fibrillation (CMS-HCC) 05/04/2016     Priority: High     Class: Acute   • 4-V CABG 4/25/16 (LIMA-LAD, SVG-diag, SVG-OM, SVG-PDA) 05/02/2016     Priority: High   • History of MI (myocardial infarction) 04/25/2016     Priority: High   • Anemia 05/04/2016     Priority: Medium     Class: Acute   • Leukocytosis 05/04/2016     Priority: Medium     Class: Acute   • Peripheral neuropathy (CMS-HCC) 05/04/2016     Priority: Medium     Class: Acute   • Essential hypertension 05/29/2012     Priority: Medium   • DM (diabetes mellitus) (CMS-HCC) 05/22/2012     Priority: Medium   • Achilles tendon infection 02/10/2015     Priority: Low   • Dyslipidemia, goal LDL below 70 05/29/2012     Priority: Low   • Vitamin D insufficiency 05/29/2012     Priority: Low   • CHF (congestive heart failure), NYHA class II, chronic, systolic (CMS-HCC) 01/30/2018   • Dyslipidemia 04/26/2017   • Coronary artery disease of bypass graft of native heart with stable angina pectoris (CMS-HCC) 07/15/2016   • DM (diabetes mellitus) type II controlled, neurological manifestation (CMS-HCC) 07/06/2016   • Chronic pain 07/06/2016   • Cardiomyopathy (CMS-HCC) 05/31/2016   • Open wound of knee, leg (except thigh), and ankle, with tendon involvement 02/25/2015   • DDD (degenerative disc disease) 02/14/2013   • Arthritis of ankle joint 02/14/2013   • Open wound 02/14/2013   • Family history of diabetes mellitus 05/22/2012       Current Outpatient Prescriptions   Medication Sig Dispense Refill   • glucose blood (TRUETEST TEST) strip AS DIRECTED TEST BLOOD SUGAR 4 TIMES DAILY 100 Strip 11   • lansoprazole (PREVACID) 15 MG CAPSULE DELAYED RELEASE Take 1 Cap by mouth every day. 90 Cap 0   • metFORMIN ER (GLUCOPHAGE XR) 500 MG TABLET SR 24 HR Take 2 Tabs by mouth every bedtime. 90 Tab 3   • amlodipine (NORVASC) 5 MG Tab Take 1 Tab by mouth every day. 90 Tab 3   • lisinopril (PRINIVIL) 10 MG Tab TAKE 1 TAB BY MOUTH EVERY  "DAY. 90 Tab 2   • atorvastatin (LIPITOR) 40 MG Tab TAKE 1 TAB BY MOUTH EVERY BEDTIME. 90 Tab 2   • BASAGLAR KWIKPEN 100 UNIT/ML Solution Pen-injector injection INJECT 50 UNITS AS INSTRUCTED EVERY EVENING. 30 mL 2   • Insulin Pen Needle 30G X 5 MM Misc Inject 1 Each as instructed every evening. 100 Each 0   • gabapentin (NEURONTIN) 300 MG Cap Take 1 Cap by mouth 3 times a day. 90 Cap 6   • Cholecalciferol 2000 UNIT Cap Take 1 Cap by mouth every morning. 30 Cap 6   • aspirin 81 MG EC tablet Take 1 Tab by mouth every day. 30 Tab 11   • INSULIN SYRINGE .5CC/29G (ULTICARE INSULIN SYRINGE) 29G X 1/2\" 0.5 ML Misc To use twice a day with insulin 60 Each 2   • Misc. Devices Misc True metrix glucometer and teststrips to check blood sugars qid #120 120 Device 6   • INSULIN SYRINGE .5CC/29G (ULTICARE INSULIN SYRINGE) 29G X 1/2\" 0.5 ML Misc USE AS DIRECTED BY PHYSICIAN 60 Each 0     No current facility-administered medications for this visit.        /76   Pulse 76   Temp 36.9 °C (98.5 °F)   Resp 19   Ht 1.778 m (5' 10\")   Wt 87.8 kg (193 lb 9.6 oz)   SpO2 92%   BMI 27.78 kg/m²     Physical Exam   Constitutional:  Comfortable. No acute distress.   Eyes: Pupils are equal, round, and reactive to light. No scleral icterus.  Neck: Neck supple. No thyromegaly present.   Cardiovascular: Normal rate, regular rhythm and normal heart sounds.  Exam reveals no gallop and no friction rub.  No murmur heard.  Pulmonary/Chest: Lungs clear to ascultation bilaterally. Breath sounds normal. No rhonchi, wheezes or crackles.   Musculoskeletal:   No deformity noted. no edema.   Lymphadenopathy: no cervical adenopathy  Neurological: alert and oriented to person, place, and time. Cranial nerves 2-12 grossly intact. No obvious motor or sensory deficits.  Extremities: No edema. No clubbing. No cyanosis. Distal pulses 2+ bilaterally.  Skin: No Rash. No cyanosis. Nails show no clubbing.      Assessment and Plan    1. DM (diabetes mellitus) " with complications (CMS-[facility])  - poor blood sugar control based on A1c result   - History of DM since 4-5 years and not sure when exactly   - Neuropathy on the lower extremities on gabapentin 300 mg 3 times a day  - Nephropathy negative for microalbumin  - Unable to follow with ophthalmologist due to difficult transportation to far area  - Unsuccessful attempt to do POCT Retinal Eye Exam  - patient denies smoking, drink alcohol occasionally.  - last hemoglobin A1c was  Lab Results   Component Value Date/Time    HBA1C 9.2 02/28/2018 02:45 PM    HBA1C 10.9 (H) 12/19/2016 11:47 AM    HBA1C 9.5 (H) 09/19/2016 09:26 AM    HBA1C 8.3 (H) 05/23/2016 10:50 AM     - Feet examination no sign of infections  - Blood pressure is good on 10 mg lisinopril 5 mg amlodipine  - glucose blood (TRUETEST TEST) strip; AS DIRECTED TEST BLOOD SUGAR 4 TIMES DAILY  Dispense: 100 Strip; Refill: 11    Plan   - Increase the dose of metformin 500 to 1000 mg  - metFORMIN ER (GLUCOPHAGE XR) 500 MG TABLET SR 24 HR; Take 2 Tabs by mouth every bedtime.  Dispense: 90 Tab; Refill: 3  - continue insulin 50 UNITS     2. Gastroesophageal reflux disease, esophagitis presence not specified  - Refill required/ no acute symptoms/   - lansoprazole (PREVACID) 15 MG CAPSULE DELAYED RELEASE; Take 1 Cap by mouth every day.  Dispense: 90 Cap; Refill: 0    3. Peripheral neuropathy   On gabapentin 300 mg 3 times a day           Signed by: Kitty Ang M.D.

## 2018-03-12 DIAGNOSIS — E11.8 DM (DIABETES MELLITUS) WITH COMPLICATIONS (HCC): ICD-10-CM

## 2018-03-12 NOTE — TELEPHONE ENCOUNTER
Was the patient seen in the last year in this department? Yes  Pt last seen on: 02/28/2018 by Dr. Mack Next appt on: 02/28/2018      Does patient have an active prescription for medications requested? No     Received Request Via: Pharmacy

## 2018-03-18 RX ORDER — METFORMIN HYDROCHLORIDE 500 MG/1
500 TABLET, EXTENDED RELEASE ORAL
Qty: 90 TAB | Refills: 2 | Status: SHIPPED | OUTPATIENT
Start: 2018-03-18

## 2018-04-17 DIAGNOSIS — E11.42 DIABETIC POLYNEUROPATHY ASSOCIATED WITH TYPE 2 DIABETES MELLITUS (HCC): ICD-10-CM

## 2018-04-17 DIAGNOSIS — E55.9 VITAMIN D DEFICIENCY DISEASE: ICD-10-CM

## 2018-04-17 NOTE — TELEPHONE ENCOUNTER
Last seen: 02/28/18 by Dr. Ang  Next appt: None     Was the patient seen in the last year in this department? Yes   Does patient have an active prescription for medications requested? No   Received Request Via: Pharmacy

## 2018-04-18 RX ORDER — ACETAMINOPHEN 160 MG
1 TABLET,DISINTEGRATING ORAL EVERY MORNING
Qty: 90 CAP | Refills: 0 | Status: SHIPPED | OUTPATIENT
Start: 2018-04-18

## 2018-04-18 RX ORDER — GABAPENTIN 300 MG/1
CAPSULE ORAL
Qty: 90 CAP | Refills: 0 | Status: SHIPPED | OUTPATIENT
Start: 2018-04-18 | End: 2018-05-24 | Stop reason: SDUPTHER

## 2018-06-30 RX ORDER — INSULIN GLARGINE 100 [IU]/ML
50 INJECTION, SOLUTION SUBCUTANEOUS EVERY EVENING
Qty: 30 ML | Refills: 0 | Status: SHIPPED | OUTPATIENT
Start: 2018-06-30 | End: 2018-08-29 | Stop reason: SDUPTHER

## 2018-07-25 ENCOUNTER — TELEPHONE (OUTPATIENT)
Dept: CARDIOLOGY | Facility: MEDICAL CENTER | Age: 63
End: 2018-07-25

## 2018-07-25 NOTE — TELEPHONE ENCOUNTER
Received a clearance request from Hernan Blair D.M.D. For pt's dental procedure-extractions, alveoloplasty under IV conscious sedation and local anesthesia (2% Lidocaine w/ 1:100,000 epi)    To Dr Kelley

## 2018-07-26 NOTE — TELEPHONE ENCOUNTER
Discussed w/ Dr Kelley, per Dr Kelley, pt clear for dental procedure including use of Epi, no pre-med abx needed.     Clearance form faxed back to Hernan Blair D.M.D office w/ Dr Kelley recommendations, F#[phone]    Attempted to call pt, no answer, left vm to call back     Copy of clearance form to scanning

## 2021-03-03 DIAGNOSIS — Z23 NEED FOR VACCINATION: ICD-10-CM

## 2021-03-19 ENCOUNTER — HOSPITAL ENCOUNTER (OUTPATIENT)
Facility: MEDICAL CENTER | Age: 66
End: 2021-03-19
Attending: ORTHOPAEDIC SURGERY | Admitting: ORTHOPAEDIC SURGERY
Payer: COMMERCIAL

## 2021-03-26 ENCOUNTER — PRE-ADMISSION TESTING (OUTPATIENT)
Dept: ADMISSIONS | Facility: MEDICAL CENTER | Age: 66
End: 2021-03-26
Attending: ORTHOPAEDIC SURGERY
Payer: COMMERCIAL

## 2021-03-26 VITALS — HEIGHT: 70 IN | BODY MASS INDEX: 23.58 KG/M2 | WEIGHT: 164.68 LBS
